# Patient Record
Sex: FEMALE
[De-identification: names, ages, dates, MRNs, and addresses within clinical notes are randomized per-mention and may not be internally consistent; named-entity substitution may affect disease eponyms.]

---

## 2018-05-05 ENCOUNTER — HOSPITAL ENCOUNTER (INPATIENT)
Dept: HOSPITAL 42 - ED | Age: 58
LOS: 4 days | Discharge: HOME | DRG: 557 | End: 2018-05-09
Attending: FAMILY MEDICINE | Admitting: FAMILY MEDICINE
Payer: MEDICAID

## 2018-05-05 VITALS — BODY MASS INDEX: 34.2 KG/M2

## 2018-05-05 DIAGNOSIS — K57.10: ICD-10-CM

## 2018-05-05 DIAGNOSIS — I12.9: ICD-10-CM

## 2018-05-05 DIAGNOSIS — K80.20: ICD-10-CM

## 2018-05-05 DIAGNOSIS — E66.9: ICD-10-CM

## 2018-05-05 DIAGNOSIS — K21.9: ICD-10-CM

## 2018-05-05 DIAGNOSIS — M89.9: ICD-10-CM

## 2018-05-05 DIAGNOSIS — C90.00: ICD-10-CM

## 2018-05-05 DIAGNOSIS — K85.10: Primary | ICD-10-CM

## 2018-05-05 DIAGNOSIS — Z92.21: ICD-10-CM

## 2018-05-05 DIAGNOSIS — K76.0: ICD-10-CM

## 2018-05-05 DIAGNOSIS — N18.4: ICD-10-CM

## 2018-05-05 DIAGNOSIS — E11.22: ICD-10-CM

## 2018-05-05 DIAGNOSIS — Z82.49: ICD-10-CM

## 2018-05-05 DIAGNOSIS — N17.9: ICD-10-CM

## 2018-05-05 DIAGNOSIS — Z87.891: ICD-10-CM

## 2018-05-05 DIAGNOSIS — Z88.5: ICD-10-CM

## 2018-05-05 LAB
ALBUMIN SERPL-MCNC: 4.4 G/DL (ref 3–4.8)
ALBUMIN/GLOB SERPL: 1.3 {RATIO} (ref 1.1–1.8)
ALT SERPL-CCNC: 827 U/L (ref 7–56)
APPEARANCE UR: CLEAR
APTT BLD: 26.5 SECONDS (ref 25.1–36.5)
AST SERPL-CCNC: 426 U/L (ref 14–36)
BACTERIA #/AREA URNS HPF: (no result) /[HPF]
BASOPHILS # BLD AUTO: 0.02 K/MM3 (ref 0–2)
BASOPHILS NFR BLD: 0.4 % (ref 0–3)
BILIRUB UR-MCNC: (no result) MG/DL
BUN SERPL-MCNC: 22 MG/DL (ref 7–21)
CALCIUM SERPL-MCNC: 9.9 MG/DL (ref 8.4–10.5)
COLOR UR: YELLOW
EOSINOPHIL # BLD: 0.2 10*3/UL (ref 0–0.7)
EOSINOPHIL NFR BLD: 2.7 % (ref 1.5–5)
EPI CELLS #/AREA URNS HPF: (no result) /HPF (ref 0–5)
ERYTHROCYTE [DISTWIDTH] IN BLOOD BY AUTOMATED COUNT: 13.3 % (ref 11.5–14.5)
GFR NON-AFRICAN AMERICAN: 23
GLUCOSE UR STRIP-MCNC: 250 MG/DL
GRANULOCYTES # BLD: 3.21 10*3/UL (ref 1.4–6.5)
GRANULOCYTES NFR BLD: 58.8 % (ref 50–68)
HDLC SERPL-MCNC: 96 MG/DL (ref 29–60)
HGB BLD-MCNC: 15 G/DL (ref 12–16)
INR PPP: 0.99 (ref 0.93–1.08)
LDLC SERPL-MCNC: 75 MG/DL (ref 0–129)
LEUKOCYTE ESTERASE UR-ACNC: NEGATIVE LEU/UL
LYMPHOCYTES # BLD: 1.6 10*3/UL (ref 1.2–3.4)
LYMPHOCYTES NFR BLD AUTO: 29.5 % (ref 22–35)
MCH RBC QN AUTO: 29.9 PG (ref 25–35)
MCHC RBC AUTO-ENTMCNC: 33.6 G/DL (ref 31–37)
MCV RBC AUTO: 89 FL (ref 80–105)
MONOCYTES # BLD AUTO: 0.5 10*3/UL (ref 0.1–0.6)
MONOCYTES NFR BLD: 8.6 % (ref 1–6)
PH UR STRIP: 7 [PH] (ref 4.7–8)
PLATELET # BLD: 181 10^3/UL (ref 120–450)
PMV BLD AUTO: 11.9 FL (ref 7–11)
PROT UR STRIP-MCNC: 30 MG/DL
PROTHROMBIN TIME: 11.4 SECONDS (ref 9.4–12.5)
RBC # BLD AUTO: 5.02 10^6/UL (ref 3.5–6.1)
RBC # UR STRIP: (no result) /UL
RBC #/AREA URNS HPF: (no result) /HPF (ref 0–2)
SP GR UR STRIP: 1.01 (ref 1–1.03)
UROBILINOGEN UR STRIP-ACNC: 0.2 E.U./DL
WBC # BLD AUTO: 5.5 10^3/UL (ref 4.5–11)
WBC #/AREA URNS HPF: (no result) /HPF (ref 0–6)

## 2018-05-05 NOTE — CT
PROCEDURE:  CT Abdomen and Pelvis without intravenous contrast



HISTORY:

pancreatitis.  Elevated lipase 



COMPARISON:

None.



TECHNIQUE:

Without contrast.. 



Contrast Dose: 



Radiation dose: 



Total exam DLP = 



Total exam DLP = 862 mGy-cm.



This CT exam was performed using one or more of the following dose 

reduction techniques: Automated exposure control, adjustment of the 

mA and/or kV according to patient size, and/or use of iterative 

reconstruction technique.



FINDINGS:



LOWER THORAX:

Unremarkable. 



LIVER:

There is severe fatty infiltration of the liver.  



GALLBLADDER AND BILE DUCTS:

Multiple gallstones are seen 



PANCREAS:

There is mild swelling of the pancreas. Minimal inflammatory changes 

are seen in the adjacent fat planes.  This is consistent with 

pancreatitis.  There is a clinical history of markedly elevated 

lipase.



SPLEEN:

Unremarkable. 



ADRENALS:

Unremarkable. No mass. 



KIDNEYS AND URETERS:

Unremarkable. No hydronephrosis. No solid mass. 



VASCULATURE:

Unremarkable. No aortic aneurysm. 



BOWEL:

Unremarkable. No obstruction. No gross mural thickening. 



APPENDIX:

Unremarkable. Normal appendix. 



PERITONEUM:

Unremarkable. No free fluid. No free air. 



LYMPH NODES:

Unremarkable. No enlarged lymph nodes. 



BLADDER:

Unremarkable. 



REPRODUCTIVE:

Unremarkable. 



BONES:

No acute fracture. 



OTHER FINDINGS:

None.



IMPRESSION:

Mild pancreatitis.  No evidence of pseudocyst formation.



Severe fatty infiltration of the liver.  Multiple gallstones

## 2018-05-05 NOTE — CP.PCM.CON
History of Present Illness





- History of Present Illness


History of Present Illness: 





Surgery Consult: Dr. Auguste





Pt is a 57F with PMHx significant for HTN, DM (diet controlled) & light chain 

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 

pain x 4 days. Pt states she started having RUQ abdominal pain on wednesday 

with episodes of nausea. She noticed pain was more noticeable after eating. As 

the week progressed her pain got worse and she wasn't able to keep anything 

down. This morning she had severe pain which radiated to the R flank and 

epigastric regions so she came to the ER. 


In the ER, pt was found to have elevated liver enzymes as well as lipase of 89,

000. CT abdomen/pelvis was obtained and showed evidence of gallstones with 

pancreatitis. Surgery called to evaluate. Currently, pt is resting comfortably 

in bed. States her pain is slightly better but still present. She denies any 

episodes of vomiting but admits to nausea. Denies F/C, chest pain or SOB. 





PMHx: as stated above


PSHx: denies


SocialHx: 20+ yr smoking hx, quit years ago. Social EtOH, denies drugs


All: codeine  





Review of Systems





- Review of Systems


All systems: reviewed and no additional remarkable complaints except (as per HPI

)





Past Patient History





- Infectious Disease


Hx of Infectious Diseases: None





- Past Social History


Smoking Status: Former Smoker


Alcohol: Social


Drugs: Denies





- CARDIAC


Hx Hypertension: Yes





- PULMONARY


Hx Respiratory Disorders: No





- NEUROLOGICAL


Hx Neurological Disorder: No





- RENAL


Hx Chronic Kidney Disease: Yes


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





- ENDOCRINE/METABOLIC


Hx Diabetes Mellitus Type 2: Yes (borderline)





- HEMATOLOGICAL/ONCOLOGICAL


Hx Chemotherapy: Yes


Other/Comment: light chain deposition disease dx 2011, rare blood cell disease, 

pt was treated with chemotherapy and completed, pt has checkups every 2 months 

with Dr. Stanton





- INTEGUMENTARY


Hx Dermatological Problems: No





- GASTROINTESTINAL


Hx Gall Bladder Disease: Yes (gallstones)


Hx Gastroesophageal Reflux: Yes





- SURGICAL HISTORY


Hx Surgeries: No





- ANESTHESIA


Hx Anesthesia: No





Meds


Allergies/Adverse Reactions: 


 Allergies











Allergy/AdvReac Type Severity Reaction Status Date / Time


 


codeine Allergy  SHORTNESS Verified 05/05/18 09:25





   OF BREATH  














- Medications


Medications: 


 Current Medications





Sodium Chloride (Sodium Chloride 0.9%)  1,000 mls @ 100 mls/hr IV .Q10H STA


   Stop: 05/06/18 00:02


   Last Admin: 05/05/18 14:47 Dose:  100 mls/hr


Lactated Ringer's (Lactated Ringer's)  1,000 mls @ 175 mls/hr IV .Q5H43M [facility]











Physical Exam





- Constitutional


Appears: Well, No Acute Distress





- Head Exam


Head Exam: ATRAUMATIC, NORMOCEPHALIC





- Eye Exam


Eye Exam: Normal appearance





- ENT Exam


ENT Exam: Mucous Membranes Moist





- Respiratory Exam


Respiratory Exam: NORMAL BREATHING PATTERN





- Cardiovascular Exam


Cardiovascular Exam: RRR





- GI/Abdominal Exam


GI & Abdominal Exam: Soft, Tenderness (epigsatric).  absent: Distended, Guarding





- Neurological Exam


Neurological exam: Alert, Oriented x3





- Skin


Skin Exam: Dry, Warm





Results





- Vital Signs


Recent Vital Signs: 


 Last Vital Signs











Temp  99.4 F   05/05/18 14:47


 


Pulse  67   05/05/18 15:18


 


Resp  19   05/05/18 15:18


 


BP  114/75   05/05/18 14:47


 


Pulse Ox  98   05/05/18 15:18














- Labs


Result Diagrams: 


 05/05/18 09:40





 05/05/18 09:40





- Imaging and Cardiology


  ** CT scan - abdomen


Status: Image reviewed by me, Report reviewed by me





Assessment & Plan





- Assessment and Plan (Free Text)


Assessment: 





57F with gallstone pancreatitis


Plan: 


- NPO with aggressive IVF resuscitation


- serial abdominal exams


- GI recs 


- AM labs to monitor LFTs


- plan for cholecystectomy once pancreatitis resolves


- d/w Dr. Molina Uribe, PGY-3

## 2018-05-05 NOTE — US
HISTORY:

ROUT MED EXAM



COMPARISON:

None.



TECHNIQUE:

Sonographic evaluation of the abdomen.



FINDINGS:



LIVER:

Measures 13.5 by 12 cm.  Increased echogenicity of the liver 

parenchyma. No mass. No intrahepatic bile duct dilatation.



GALLBLADDER:

Gallstones and sludge



COMMON BILE DUCT:

Measures 8 mm. No stones. No dilatation.



PANCREAS:

Unremarkable as visualized. No mass. No ductal dilatation.



RIGHT KIDNEY:

Measures 9.82 x 4.10 x 5.04cm. Normal echogenicity. No calculus, mass,

 or hydronephrosis.



LEFT KIDNEY:

Measures 9.89 x 5.79 x 5.27cm. Normal echogenicity. No calculus, mass,

 or hydronephrosis.



SPLEEN:

Normal in size and contour. No mass. 9.25 x 3.06 



AORTA:

No aneurysmal dilatation. 



IVC:

Unremarkable. 



OTHER FINDINGS:

None. 



IMPRESSION:

Severe fatty infiltration of the liver.



Gallstones and sludge in the gallbladder. No evidence of acute 

cholecystitis

## 2018-05-05 NOTE — ED PDOC
Arrival/HPI





- General


Chief Complaint: Abdominal Pain


Time Seen by Provider: 05/05/18 09:37


Historian: Patient





- History of Present Illness


Narrative History of Present Illness (Text): 





05/05/18 10:39


A 57 year old female mildly obese, whose past medical history includes 

gallstones, light chain kidney disease, hypertension, and borderline pre-

diabetic, presents to the emergency department for right upper quadrant pain 

that wraps in a band around her flank, she has associated mild nausea with 

pain. The patient reports that the pain has worsened over the last 3 days and 

is at its worse today. She notes that she urine is darker and her stool appears 

to be "chalky," but she denies any fever, chills, vomiting, or any other 

complaints at this time. 


Time/Duration: < week (3 days)


Symptom Onset: Gradual


Symptom Course: Worsening


Severity Level: Mild


Activities at Onset: Light


Context: Home





Past Medical History





- Provider Review


Nursing Documentation Reviewed: Yes





- Infectious Disease


Hx of Infectious Diseases: None





- Reproductive


Menopause: Yes





- Cardiac


Hx Hypertension: Yes





- Endocrine/Metabolic


Hx Diabetes Mellitus Type 2:  (borderline)





- Psychiatric


Hx Substance Use: No





Family/Social History





- Physician Review


Nursing Documentation Reviewed: Yes


Family/Social History: No Known Family HX


Smoking Status: Never Smoked


Hx Alcohol Use: Yes


Frequency of alcohol use: Socially


Hx Substance Use: No





Allergies/Home Meds


Allergies/Adverse Reactions: 


Allergies





codeine Allergy (Verified 05/05/18 09:25)


 SHORTNESS OF BREATH








Home Medications: 


 Home Meds











 Medication  Instructions  Recorded  Confirmed


 


Aspirin [Aspirin Chewable] 81 mg PO DAILY 05/05/18 05/05/18


 


Valsartan [Diovan] 80 mg PO DAILY 05/05/18 05/05/18














Review of Systems





- Physician Review


All systems were reviewed & negative as marked: Yes





- Review of Systems


Constitutional: absent: Fevers


Gastrointestinal: Abdominal Pain (RUQ), Stool Changes ("chalky")


Genitourinary Female: Other ("darker urine" )





Physical Exam


Vital Signs Reviewed: Yes


Vital Signs











  Temp Pulse Resp BP Pulse Ox


 


 05/05/18 15:18   67  19   98


 


 05/05/18 14:47  99.4 F  96 H  18  114/75  96


 


 05/05/18 09:20  98.1 F  136 H  22  136/81  96











Temperature: Afebrile


Blood Pressure: Normal


Pulse: Tachycardic


Respiratory Rate: Normal


Appearance: Positive for: Well-Appearing, Non-Toxic, Comfortable


Pain Distress: None


Mental Status: Positive for: Alert and Oriented X 3





- Systems Exam


Head: Present: Atraumatic, Normocephalic


Pupils: Present: PERRL


Extroacular Muscles: Present: EOMI


Conjunctiva: Present: Normal


Mouth: Present: Moist Mucous Membranes


Neck: Present: Normal Range of Motion


Respiratory/Chest: Present: Clear to Auscultation, Good Air Exchange.  No: 

Respiratory Distress, Accessory Muscle Use


Cardiovascular: Present: Regular Rate and Rhythm, Normal S1, S2.  No: Murmurs


Abdomen: Present: Tenderness (RUQ).  No: Distention, Peritoneal Signs, Guarding


Back: Present: Normal Inspection


Upper Extremity: Present: Normal Inspection.  No: Cyanosis, Edema


Lower Extremity: Present: Normal Inspection.  No: Edema


Neurological: Present: GCS=15, CN II-XII Intact, Speech Normal


Skin: Present: Warm, Dry, Normal Color.  No: Rashes


Psychiatric: Present: Alert, Oriented x 3, Normal Insight, Normal Concentration





Medical Decision Making


ED Course and Treatment: 


05/05/18 10:47


Impression: A 57 year old female with RUQ 





Differential Diagnosis included but are not limited to:  





Plan:


-- EKG


-- Labs


-- Pepcid, Morphine, Zofran, IV Fluids


-- Abdomen Complete US 


-- Reassess and disposition





Progress Notes:





EKG:


Ordered, reviewed, and independently interpreted the EKG.


Rate :  1050BPM


Rhythm : Sinus Tachycardia 


Interpretation : No ST-segment elevations or depressions, no T-wave inversions, 

normal intervals.


Comparison : No previous EKG for comparison.  





- Lab Interpretations


Lab Results: 








 05/05/18 09:40 





 05/05/18 09:40 





 Lab Results





05/05/18 09:40: Urine Color Yellow, Urine Appearance Clear, Urine pH 7.0, Ur 

Specific Gravity 1.015, Urine Protein 30 H, Urine Glucose (UA) 250 H, Urine 

Ketones Negative, Urine Blood Trace-intact H, Urine Nitrate Negative, Urine 

Bilirubin Small H, Urine Urobilinogen 0.2, Ur Leukocyte Esterase Negative, 

Urine RBC 0 - 2, Urine WBC 0 - 2, Ur Epithelial Cells 0 - 2, Urine Bacteria Few


05/05/18 09:40: Sodium 145, Potassium 3.8, Chloride 109 H, Carbon Dioxide 24, 

Anion Gap 16, BUN 22 H, Creatinine 2.2 H, Est GFR ( Amer) 28, Est GFR (

Non-Af Amer) 23, Random Glucose 224 H, Calcium 9.9, Magnesium 2.2, Total 

Bilirubin 4.4 H,  H,  H, Alkaline Phosphatase 220 H, Total 

Protein 7.9, Albumin 4.4, Globulin 3.5, Albumin/Globulin Ratio 1.3, Lipase 

13401 H


05/05/18 09:40: PT 11.4, INR 0.99, APTT 26.5


05/05/18 09:40: WBC 5.5, RBC 5.02, Hgb 15.0, Hct 44.7, MCV 89.0, MCH 29.9, MCHC 

33.6, RDW 13.3, Plt Count 181, MPV 11.9 H, Gran % 58.8, Lymph % (Auto) 29.5, 

Mono % (Auto) 8.6 H, Eos % (Auto) 2.7, Baso % (Auto) 0.4, Gran # 3.21, Lymph # (

Auto) 1.6, Mono # (Auto) 0.5, Eos # (Auto) 0.2, Baso # (Auto) 0.02


05/05/18 09:00: Triglycerides 97, Cholesterol 221 H, LDL Cholesterol Direct 75, 

HDL Cholesterol 96 H











- RAD Interpretation


Radiology Orders: 








05/05/18 09:37


ABDOMEN COMPLETE [US] Stat 





05/05/18 12:33


ABD & PELVIS W/O PO OR IV CONT [CT] Stat 














- Medication Orders


Current Medication Orders: 








Hydromorphone HCl (Dilaudid)  0.5 mg IVP Q6H PRN


   PRN Reason: Pain, severe (8-10)


Sodium Chloride (Sodium Chloride 0.9%)  1,000 mls @ 100 mls/hr IV .Q10H STA


   Stop: 05/06/18 00:02


   Last Admin: 05/05/18 14:47  Dose: 100 mls/hr





eMAR Start Stop


 Document     05/05/18 14:47  CASTS1  (Rec: 05/05/18 14:47  CASTS1  [id number])


     Intravenous Solution


      Start Date                                 05/05/18


      Start Time                                 14:47


      End Date                                   05/05/18





Lactated Ringer's (Lactated Ringer's)  1,000 mls @ 175 mls/hr IV .Q5H43M [facility]INIC


   Last Admin: 05/05/18 17:27  Dose: 175 mls/hr





eMAR Start Stop


 Document     05/05/18 17:27  MARISOL  (Rec: 05/05/18 17:27  MARISOL  [id number])


     Intravenous Solution


      Start Date                                 05/05/18


      Start Time                                 17:27





Ondansetron HCl (Zofran Inj)  4 mg IVP Q4H PRN


   PRN Reason: Nausea/Vomiting





Discontinued Medications





Famotidine (Pepcid 20mg/50ml Premix)  20 mg IVPB STAT STA


   Stop: 05/05/18 09:54


   Last Admin: 05/05/18 10:07  Dose: 20 mg





eMAR Start Stop


 Document     05/05/18 10:07  CASTS1  (Rec: 05/05/18 10:07  CASTS1  [id number])


     Intravenous Solution


      Start Date                                 05/05/18


      Start Time                                 10:07





Sodium Chloride (Sodium Chloride 0.9%)  1,000 mls @ 1,000 mls/hr IV .Q1H STA


   Stop: 05/05/18 10:36


   Last Admin: 05/05/18 10:07  Dose: 1,000 mls/hr





eMAR Start Stop


 Document     05/05/18 10:07  CASTS1  (Rec: 05/05/18 10:07  CASTS1  [id number])


     Intravenous Solution


      Start Date                                 05/05/18


      Start Time                                 10:07


      End Date                                   05/05/18





Morphine Sulfate (Morphine)  2 mg IVP STAT STA


   Stop: 05/05/18 09:53


   Last Admin: 05/05/18 10:08  Dose: 2 mg





MAR Pain Assessment


 Document     05/05/18 10:08  CASTS1  (Rec: 05/05/18 10:08  CASTS1  [id number])


     Pain Reassessment


      Is this a pain reassessment?               No


     Sleep


      Is patient sleeping during reassessment?   No


     Presence of Pain


      Presence of Pain                           Yes


     Pain Scale Used


      Pain Scale Used                            Numeric


     Location


      Left, Right or Bilateral                   Right


      Pain Location Body Site                    Abdomen


     Description


      Description                                Constant


      Intensity of Pain at present               10


      Pain Behavior                              Facial Grimacing


      Aggravating Factors                        Changing Position


      Alleviating Factors/Management             Medication


       Techniques                                


      Alleviating Factors                        Medication


IVP Administration


 Document     05/05/18 10:08  CASTS1  (Rec: 05/05/18 10:08  [facility]  [id number][facility]


     Charges for Administration


      # of IVP Administrations                   1





Ondansetron HCl (Zofran Inj)  4 mg IVP STAT STA


   Stop: 05/05/18 09:54


   Last Admin: 05/05/18 10:07  Dose: 4 mg





IVP Administration


 Document     05/05/18 10:07  CASTS1  (Rec: 05/05/18 10:07  [facility]  [id number])


     Charges for Administration


      # of IVP Administrations                   1





Pneumococcal Polyvalent Vaccine (Pneumovax 23 Vaccine)  0.5 ml IM .ONCE ONE


   Stop: 05/05/18 16:35











- Scribe Statement


The provider has reviewed the documentation as recorded by the Violeta Palacios





Provider Scribe Attestation:


All medical record entries made by the Scribe were at my direction and 

personally dictated by me. I have reviewed the chart and agree that the record 

accurately reflects my personal performance of the history, physical exam, 

medical decision making, and the department course for this patient. I have 

also personally directed, reviewed, and agree with the discharge instructions 

and disposition.








Disposition/Present on Arrival





- Present on Arrival


Any Indicators Present on Arrival: No


History of DVT/PE: No


History of Uncontrolled Diabetes: No


Urinary Catheter: No


History of Decub. Ulcer: No


History Surgical Site Infection Following: None





- Disposition


Have Diagnosis and Disposition been Completed?: Yes


Diagnosis: 


 Pancreatitis, Cholelithiasis





Disposition: HOSPITALIZED


Disposition Time: 15:20


Patient Plan: Admission, ICU


Condition: FAIR

## 2018-05-06 LAB
ALBUMIN SERPL-MCNC: 3.4 G/DL (ref 3–4.8)
ALBUMIN/GLOB SERPL: 1.2 {RATIO} (ref 1.1–1.8)
ALT SERPL-CCNC: 579 U/L (ref 7–56)
AST SERPL-CCNC: 183 U/L (ref 14–36)
BUN SERPL-MCNC: 20 MG/DL (ref 7–21)
CALCIUM SERPL-MCNC: 9.1 MG/DL (ref 8.4–10.5)
ERYTHROCYTE [DISTWIDTH] IN BLOOD BY AUTOMATED COUNT: 13.7 % (ref 11.5–14.5)
GFR NON-AFRICAN AMERICAN: 27
HGB BLD-MCNC: 12.5 G/DL (ref 12–16)
LIPASE SERPL-CCNC: 7497 U/L (ref 23–300)
MCH RBC QN AUTO: 29.7 PG (ref 25–35)
MCHC RBC AUTO-ENTMCNC: 32.9 G/DL (ref 31–37)
MCV RBC AUTO: 90.3 FL (ref 80–105)
PLATELET # BLD: 149 10^3/UL (ref 120–450)
PMV BLD AUTO: 12 FL (ref 7–11)
RBC # BLD AUTO: 4.21 10^6/UL (ref 3.5–6.1)
WBC # BLD AUTO: 5.2 10^3/UL (ref 4.5–11)

## 2018-05-06 NOTE — PN
DATE:  05/06/2018



LOCATION:  The patient is in room 270, bed 2.



PROBLEM:  This is a 57-year-old female, who was admitted to the emergency

room with signs and symptoms of worsening right upper quadrant pain

consistent with evolving cholelithiasis/cholecystitis.  In the ER, was

found to have a significant evidence of gallbladder stone-induced

pancreatitis.  The patient was admitted, given rapidly IV fluids, kept

n.p.o. and has been gradually improving overnight with decrease in the

numbers as far as the liver enzymes are concerned.  Decrease in the

alkaline phosphatase, decrease in lipase as well.



SUBJECTIVE:  The patient is feeling better.  Pain is resolved.  She said

she is hungry and wants to eat.  No other significant complaints.  Denies

any history of nausea or vomiting, fevers or chills.



OBJECTIVE:  The patient is examined in bed.  Vital signs stable.  T-max is

98.4, pulse is 68, respirations 20, blood pressure 127/59, pulse ox 96% on

room air.



PHYSICAL EXAMINATION:

GENERAL:  The patient is awake, alert and oriented, in no acute distress.

HEENT:  Head is normocephalic, atraumatic.  Conjunctivae pale.  Sclerae are

anicteric.  Pupils are equally reactive to light and accommodation. 

Examination of the oropharynx, there are no oropharyngeal lesions.  Tongue

is moist.

NECK:  Supple.  There is no adenopathy.

LUNGS:  Clear to percussion and auscultation.

ABDOMEN:  Examination of the abdomen reveals it to be soft, nondistended. 

There is no rebound, rigidity or guarded noted.

NEUROLOGICAL:  Reveals higher functions to be normal.  No focal deficits

are noted.

SKIN:  Skin turgor is normal.  No skin lesions are noted.

/RECTAL:  Deferred.



LABORATORY DATA:  Lab data was reviewed.  White count is 5.2, hemoglobin

12.5, hematocrit 38, platelet count 149,000.  Sodium is 147, K is 3.8,

chloride is 114, CO2 is 20, creatinine is 1.9, blood sugar 107.  PT/INR is

within normal limits.



ASSESSMENT, NOTES AND PLAN:  The patient is admitted with gallstone

pancreatitis and the background history of having hypertension, renal

azotemia, chronic kidney disease 3 and light chain deposition disease for

which she is being monitored by us, status post chemotherapy 5 years ago. 

Plan is to monitor serial liver enzymes, which are trending down.  Continue

to monitor liver function tests and her chemistries.  The patient is going

to be kept n.p.o.  Continue IV fluids.  Serial abdominal exams.  Awaiting

Gastrointestinal for possible endoscopy.  Plan would be for cholecystectomy

once the pancreatitis resolved, which maybe delayed for several days and

may be up to several weeks once endoscopy is completed.  Routine post exam

instructions have been given to the patient.  Labs have been monitored and

followed over the next several days.



Time spent on the patient 45 minutes.





__________________________________________

Kathya Stanton MD





DD:  05/06/2018 15:01:58

DT:  05/06/2018 15:18:15

Job # [id number]

## 2018-05-06 NOTE — CON
DATE:



NEPHROLOGY CONSULTATION



LOCATION:  [facility] (formerly [facility]3].



HISTORY OF PRESENT ILLNESS:  A 57-year-old female with past medical history

of hypertension, light chain deposition disease (per renal biopsy, status

post chemo with Velcade, last received in 08/2013) and CKD stage 3B/4. 

Presented to ED today with severe epigastric and right lower quadrant

abdominal pain.  Nephrology consulted for advanced CKD management.



The patient reports first feeling the sort of pain sometime last year, but

symptoms had resolved.  Again, felt this similar pain 3 days ago, but again

the symptoms resolved.  The patient, however, since yesterday has been

having severe right upper quadrant and epigastric abdominal pain also with

associated back pain.  Reports associated nausea, but no vomiting.  Has not

eaten since past 1 day due to symptoms.  The patient otherwise denies any

diarrhea.  Has been having some dysuria lately.



The patient otherwise denies any lightheadedness.  Denies any shortness of

breath.  No chest pain or palpitations.



PAST MEDICAL HISTORY:  As above.  Has been following in our renal clinic

with stable renal function.



SOCIAL HISTORY:  Denies smoking.  Occasional alcohol use, but not lately.



FAMILY HISTORY:  Mother with heart disease.



REVIEW OF SYSTEMS:

CONSTITUTIONAL:  As per HPI.  Has been doing well up until current

symptoms.

HEENT:  Denies any URI symptoms.

RESPIRATORY:  Denies any difficulty breathing.  No cough.

CARDIOVASCULAR:  No chest pain or palpitations.

GI:  As per HPI.

:  As per HPI.

MUSCULOSKELETAL:  Denies taking any pain medications, but does get

occasional joint pains.

NEURO:  No dizziness.



PHYSICAL EXAMINATION:

VITAL SIGNS:  Vitals this afternoon, blood pressure 114/75, heart rate 96,

respirations 18, temperature 99.4, O2 sat 98% on room air.

GENERAL:  No distress.  Conversing coherently in full sentences.

HEENT:  Moist mucous membranes.  Mild scleral icterus seen.

RESPIRATORY:  Lungs clear to auscultation bilaterally.  No rales.  No

rhonchi.  No wheezes.

CARDIOVASCULAR:  Heart sounds S1 and S2 normal.  No murmurs.  No gallops. 

No rubs.  Regular rate and rhythm.

GI:  Abdomen soft, nondistended.  Tenderness in epigastric and right upper

quadrant on light palpation.

:  No bladder distention.

EXTREMITIES:  No significant leg edema.

SKIN:  Warm.  No cyanosis.

NEURO:  No numbness in feet.  No tremor of outstretched hands.

PSYCHIATRIC:  Normal mood, normal affect.



LABORATORY DATA:  CBC:  WBC 5.5, hemoglobin 15, hematocrit 44.7, platelets

181.  Chemistry panel:  Sodium 145, potassium 3.8, chloride 109, bicarb 24,

BUN 22, creatinine 2.2, glucose 224, calcium 9.9, magnesium 2.2 with T bili

4.4, , , alk phos 220, albumin 4.4, lipase 89,479,

triglycerides 97, total cholesterol 221.



Abdominal ultrasound directly reviewed.  No hydronephrosis seen.



ASSESSMENT AND PLAN:

1.  Acute kidney injury on chronic kidney disease stage 3B/4.  Relatively,

mild increase in serum creatinine from baseline of 1.9 up to 2.2 today. 

Likely prerenal etiology in the setting of decreased p.o. intake and also

with evidence of acute pancreatitis likely causing some third spacing.

A.  Agree with IV fluids placed by surgical services with lactated Ringer's

at 175 mL/hour.

B.  Continue to avoid nephrotoxic agents.

C.  Need to keep the patient in positive fluid balance.

D.  Monitor inputs and outputs closely.

2.  Chronic kidney disease stage 3B/4.  Baseline creatinine 1.9,

corresponding to EGFR of 28 mL/minute.  The patient with mild proteinuric

kidney disease in the setting of having light chain deposition disease on

renal biopsy, treated with Velcade.

3.  _____, currently on valsartan 320 mg.

4.  Hypertension.  The patient currently normotensive.  Agree with holding

antihypertensive medications for now.

5.  Light chain deposition disease, was being monitored by Hematology every

6 weeks.  Last set of labs did not show anything on serum electrophoresis

or immunofixation, although does show proteins present in urine.  We will

continue to monitor for any increase in proteinuria.

6.  Chronic kidney disease mineral bone disorder.  PTH had been elevated. 

We will repeat level as well as 25-hydroxy vitamin D level.



Thank you for this referral.  We will be following up closely.





__________________________________________

Manuel Rose MD





DD:  05/05/2018 17:45:18

DT:  05/05/2018 17:54:38

Job # [id number]

## 2018-05-06 NOTE — HP
LOCATION:  The patient is in room 270, bed 2.



HISTORY OF PRESENT ILLNESS:  This is a 57-year-old female who is well known

to me.  Patient is being followed by us for history of light-chain

deposition disease for over 8 years for which she had treatment 7 years ago

and has been off treatment now for several years.  Patient was treated at

that time with Velcade based chemotherapy and she had progressively

worsening renal azotemia and because of the light-chain deposition and

based on obtaining biopsies and discussions with the Nephrologist, patient

was recommended systemic therapy and patient received 12 cycles of therapy

and her proteinuria abated and her light chain excretion decreased and has

not been seen now for several years and is being monitored very closely. 

Patient's history is also significant for hypertension,diabetes mellitus

that is diet controlled, who now presets to [facility] with

progressively worsening abdominal pain for the last 4 days.  Patient states

she started having right upper quadrant pain on Wednesday with episodes of

nausea.  She noticed pain, which was more noticeable after eating.  As a

week progress, pain got worse and she was unable to keep anything down. 

Patient had severe pain this morning, which radiated to the right flank and

epigastric region, so she came to the emergency room.  In the emergency

room, patient was found to have elevated liver enzymes as well as lipase of

89, 000.  CAT scan of the abdomen and pelvis was obtained and showed

evidence of gallstones with pancreatitis.  In view of this, Surgery was

already called to evaluate the patient as well as GI consultation was

obtained.  Currently, the patient is resting comfortably in the bed. 

States the pain is currently improved, better, but still present.  No

episodes of vomiting, but admits to nausea.  No chest pain, shortness of

breath, fever or chills.



PAST MEDICAL HISTORY:  Significant for what is mentioned above including

light-chain deposition disease, hypertension and diabetes.



SOCIAL HISTORY:  Patient has a history of smoking for 20 years, quit

several years ago.  Denies any EtOH abuse.  Denies any drug usage as well.



REVIEW OF SYSTEMS:  A 12-system review was done and there were no

additional complaints except what is mentioned in the HPI.



ALLERGIES:  PATIENT HAS NO KNOWN ALLERGIES.



MEDICATIONS:  Patient is currently on IV fluids with Ringer lactate at 175

mL an hour.



PHYSICAL EXAMINATION:

GENERAL:  The patient is awake, alert and oriented, in no significant

distress.

VITAL SIGNS:  Stable.  T-max of 99.4, pulse is 67, respirations 19, blood

pressure is 114/75, pulse ox is 98% on room air.

HEENT:  Head is normocephalic, atraumatic.  Conjunctivae pale.  Pupils are

equally reactive to light and accommodation.  Sclerae are anicteric. 

Examination of the oropharynx reveals moist mucous membranes.

LUNGS:  Reveal relatively clear to percussion and auscultation.

CARDIOVASCULAR SYSTEM:  Reveals PMI to be in the fifth intercostal space

inside the midclavicular line.  S1 and S2 are normal.  No gallop or murmur

is heard.

ABDOMEN:  Soft.  Patient has epigastric tenderness without significant

rebound, rigidity or guarding.  Bowel sounds are present.  No masses are

felt.

NEUROLOGIC:  Reveals higher functions to be normal.  No focal deficits are

noted.

SKIN:  Skin turgor is normal.  No skin lesions are noted.



LABORATORY DATA:  Reveals a white count of 5.5, hemoglobin of 15,

hematocrit 44.7, platelet count of 181,000.  Sodium is 145, K is 3.8,

chloride is 109, CO is 24, BUN is 22, creatinine is 2.2 and blood sugar is

224.



ASSESSMENT, NOTES AND PLAN:  Patient has cholelithiasis with gallstone

pancreatitis, has evidence on the CAT scan as there is evidence of

pancreatitis on the CT.  Plan is to keep the patient n.p.o. with aggressive

IV resuscitation.  Serial abdominal x-rays and exams.  GI evaluation is

pending.  Dr. Mack is going to see the patient.  Surgical evaluation by

Dr. Auguste and his resident is in progress.  We will continue to

monitor labs and monitor the liver function tests.  We will also monitor

for any worsening pancreatic symptoms as well.  Case discussed in detail

with emergency room attending.  We will discuss with the consultants as

well.  Condition of the patient at this point is stable, but overall

prognosis is guarded.





__________________________________________

Kathay Stanton MD



DD:  05/05/2018 20:05:46

DT:  05/06/2018 4:13:06

Job # [id number]

## 2018-05-06 NOTE — CP.PCM.PN
Subjective





- Date & Time of Evaluation


Date of Evaluation: 05/06/18


Time of Evaluation: 08:50





- Subjective


Subjective: 





Surgery: Dr. Auguste





Pt seen and examined. No acute overnight events. Pt states she is feeling a lot 

better and pain has resolved. She states she's hungry and wants to eat but 

otherwise doesn't have complaints. Denies N/V, F/C. 





Objective





- Vital Signs/Intake and Output


Vital Signs (last 24 hours): 


 











Temp Pulse Resp BP Pulse Ox


 


 98.8 F   68   20   127/59 L  96 


 


 05/06/18 06:00  05/06/18 06:00  05/06/18 06:00  05/06/18 06:00  05/06/18 06:00








Intake and Output: 


 











 05/06/18 05/06/18





 06:59 18:59


 


Intake Total 3140 


 


Balance 3140 














- Medications


Medications: 


 Current Medications





Hydromorphone HCl (Dilaudid)  0.5 mg IVP Q6H PRN


   PRN Reason: Pain, severe (8-10)


Lactated Ringer's (Lactated Ringer's)  1,000 mls @ 175 mls/hr IV .Q5H43M [facility]


   Last Admin: 05/06/18 04:53 Dose:  175 mls/hr


Ondansetron HCl (Zofran Inj)  4 mg IVP Q4H PRN


   PRN Reason: Nausea/Vomiting











- Labs


Labs: 


 





 05/06/18 07:00 





 05/06/18 07:00 





 











PT  11.4 SECONDS (9.4-12.5)   05/05/18  09:40    


 


INR  0.99  (0.93-1.08)   05/05/18  09:40    


 


APTT  26.5 Seconds (25.1-36.5)   05/05/18  09:40    














- Constitutional


Appears: Well, No Acute Distress





- Head Exam


Head Exam: ATRAUMATIC, NORMOCEPHALIC





- Eye Exam


Eye Exam: Normal appearance





- ENT Exam


ENT Exam: Mucous Membranes Moist





- Respiratory Exam


Respiratory Exam: NORMAL BREATHING PATTERN





- Cardiovascular Exam


Cardiovascular Exam: RRR





- GI/Abdominal Exam


GI & Abdominal Exam: Soft.  absent: Distended, Guarding, Tenderness





- Neurological Exam


Neurological Exam: Alert, Awake, Oriented x3





- Skin


Skin Exam: Dry, Warm





Assessment and Plan





- Assessment and Plan (Free Text)


Assessment: 





57F with gallstone pancreatitis


Plan: 





- Tbili & liver enzymes trending down; cont to monitor


- cont NPO with IVF 


- serial abdominal exams 


- plan for cholecystectomy once pancreatitis resolves





Rony, PGY-3

## 2018-05-07 LAB
ALBUMIN SERPL-MCNC: 3.5 G/DL (ref 3–4.8)
ALBUMIN/GLOB SERPL: 1.2 {RATIO} (ref 1.1–1.8)
ALT SERPL-CCNC: 385 U/L (ref 7–56)
AMYLASE SERPL-CCNC: 314 U/L (ref 35–125)
AST SERPL-CCNC: 78 U/L (ref 14–36)
BASOPHILS # BLD AUTO: 0.03 K/MM3 (ref 0–2)
BASOPHILS NFR BLD: 0.6 % (ref 0–3)
BUN SERPL-MCNC: 21 MG/DL (ref 7–21)
CALCIUM SERPL-MCNC: 9 MG/DL (ref 8.4–10.5)
EOSINOPHIL # BLD: 0.3 10*3/UL (ref 0–0.7)
EOSINOPHIL NFR BLD: 4.6 % (ref 1.5–5)
ERYTHROCYTE [DISTWIDTH] IN BLOOD BY AUTOMATED COUNT: 13.6 % (ref 11.5–14.5)
GFR NON-AFRICAN AMERICAN: 29
GRANULOCYTES # BLD: 3 10*3/UL (ref 1.4–6.5)
GRANULOCYTES NFR BLD: 55.2 % (ref 50–68)
HGB BLD-MCNC: 11.7 G/DL (ref 12–16)
LIPASE SERPL-CCNC: 777 U/L (ref 23–300)
LYMPHOCYTES # BLD: 1.7 10*3/UL (ref 1.2–3.4)
LYMPHOCYTES NFR BLD AUTO: 31.5 % (ref 22–35)
MCH RBC QN AUTO: 29.2 PG (ref 25–35)
MCHC RBC AUTO-ENTMCNC: 32.5 G/DL (ref 31–37)
MCV RBC AUTO: 89.8 FL (ref 80–105)
MONOCYTES # BLD AUTO: 0.4 10*3/UL (ref 0.1–0.6)
MONOCYTES NFR BLD: 8.1 % (ref 1–6)
PLATELET # BLD: 142 10^3/UL (ref 120–450)
PMV BLD AUTO: 11.6 FL (ref 7–11)
RBC # BLD AUTO: 4.01 10^6/UL (ref 3.5–6.1)
WBC # BLD AUTO: 5.4 10^3/UL (ref 4.5–11)

## 2018-05-07 NOTE — CP.PCM.PN
Subjective





- Date & Time of Evaluation


Date of Evaluation: 05/07/18


Time of Evaluation: 11:00





- Subjective


Subjective: 





Patient reports feeling well; tolerating liquid diet; no sob; abd/back pain 

resolved;





Objective





- Vital Signs/Intake and Output


Vital Signs (last 24 hours): 


 











Temp Pulse Resp BP Pulse Ox


 


 99.1 F   73   18   135/66   98 


 


 05/07/18 17:58  05/07/18 18:00  05/07/18 17:58  05/07/18 17:58  05/07/18 06:00








Intake and Output: 


 











 05/07/18 05/08/18





 18:59 06:59


 


Intake Total 660 


 


Output Total 1500 


 


Balance -840 














- Medications


Medications: 


 Current Medications





Heparin Sodium (Porcine) (Heparin)  5,000 units SC Q12 [facility]


   PRN Reason: Protocol


   Last Admin: 05/07/18 21:57 Dose:  Not Given


Hydromorphone HCl (Dilaudid)  0.5 mg IVP Q6H PRN


   PRN Reason: Pain, severe (8-10)


Lactated Ringer's (Lactated Ringer's)  1,000 mls @ 80 mls/hr IV .[id number] [facility]


   Last Admin: 05/07/18 17:45 Dose:  80 mls/hr


Ondansetron HCl (Zofran Inj)  4 mg IVP Q4H PRN


   PRN Reason: Nausea/Vomiting











- Labs


Labs: 


 





 05/07/18 05:30 





 05/07/18 05:30 





 











PT  11.4 SECONDS (9.4-12.5)   05/05/18  09:40    


 


INR  0.99  (0.93-1.08)   05/05/18  09:40    


 


APTT  26.5 Seconds (25.1-36.5)   05/05/18  09:40    














- Constitutional


Appears: Non-toxic, No Acute Distress





- Eye Exam


Eye Exam: Normal appearance





- ENT Exam


ENT Exam: Mucous Membranes Moist





- Respiratory Exam


Respiratory Exam: Clear to Ausculation Bilateral.  absent: Respiratory Distress





- Cardiovascular Exam


Cardiovascular Exam: RRR, +S1, +S2





- Extremities Exam


Additional comments: 





no leg edema;





- Neurological Exam


Neurological Exam: Alert, Awake





- Psychiatric Exam


Psychiatric exam: Normal Mood.  absent: Agitated





- Skin


Skin Exam: Warm.  absent: Cyanosis





Assessment and Plan


(1) Acute pancreatitis


Assessment & Plan: 


Symptoms resolved; gallstone pancreatitis, likely passed stone with LFT's/

transaminases/symptoms improved; tolerating diet;


-decreasing IVF to LR at 80 cc/hr;


Status: Acute   





(2) CKD (chronic kidney disease), stage IV


Assessment & Plan: 


Mild pre-renal SOLOMON, resolved; renal function at baseline; continue to avoid 

nephrotoxic agents;


Status: Acute   





(3) HTN (hypertension)


Assessment & Plan: 


Normotensive currently, continue to hold ARB until back to regular diet;


Status: Chronic

## 2018-05-07 NOTE — CP.PCM.PN
Subjective





- Date & Time of Evaluation


Date of Evaluation: 05/07/18


Time of Evaluation: 07:30





- Subjective


Subjective: 





Surgery: Dr. Auguste





Patient seen and examined at bedside. No acute overnight events. Patient 

reports feeling better overall, denies any abdominal pain. Patient was started 

on a liquid diet yesterday, and is tolerating small amounts of liquid. Denies 

nausea, vomiting, diarrhea, constipation, fever, chills. Patient scheduled for 

MRCP today per GI





Objective





- Vital Signs/Intake and Output


Vital Signs (last 24 hours): 


 











Temp Pulse Resp BP Pulse Ox


 


 98.3 F   66   18   122/61   98 


 


 05/07/18 06:00  05/07/18 06:00  05/07/18 06:00  05/07/18 06:00  05/07/18 06:00








Intake and Output: 


 











 05/07/18 05/07/18





 06:59 18:59


 


Intake Total 980 


 


Output Total 1300 


 


Balance -320 














- Medications


Medications: 


 Current Medications





Heparin Sodium (Porcine) (Heparin)  5,000 units SC Q12 [facility]


   PRN Reason: Protocol


   Last Admin: 05/06/18 22:12 Dose:  Not Given


Hydromorphone HCl (Dilaudid)  0.5 mg IVP Q6H PRN


   PRN Reason: Pain, severe (8-10)


Lactated Ringer's (Lactated Ringer's)  1,000 mls @ 175 mls/hr IV .Q5H43M [facility]


   Last Admin: 05/06/18 22:13 Dose:  175 mls/hr


Ondansetron HCl (Zofran Inj)  4 mg IVP Q4H PRN


   PRN Reason: Nausea/Vomiting











- Labs


Labs: 


 





 05/07/18 05:30 





 05/07/18 05:30 





 











PT  11.4 SECONDS (9.4-12.5)   05/05/18  09:40    


 


INR  0.99  (0.93-1.08)   05/05/18  09:40    


 


APTT  26.5 Seconds (25.1-36.5)   05/05/18  09:40    














- Constitutional


Appears: Non-toxic, No Acute Distress





- Head Exam


Head Exam: ATRAUMATIC, NORMOCEPHALIC





- Eye Exam


Eye Exam: Normal appearance





- ENT Exam


ENT Exam: Mucous Membranes Moist





- Cardiovascular Exam


Cardiovascular Exam: RRR





- GI/Abdominal Exam


GI & Abdominal Exam: Soft.  absent: Distended, Guarding, Rigid, Tenderness





- Neurological Exam


Neurological Exam: Alert, Awake, Oriented x3





- Skin


Skin Exam: Dry, Intact, Normal Color





Assessment and Plan





- Assessment and Plan (Free Text)


Assessment: 





58 yo F with gallstone pancreatitis


Plan: 





- LFTs and T bili trending down; continue to monitor


- Tolerating CLD; advance as per GI


- Serial abdominal exams 


- MRCP done today; negative


- Follow up lipase, amylase


- Plan for cholecystectomy once pancreatitis resolves


- Discussed with Dr. Molina Owens, PGY1

## 2018-05-07 NOTE — MRI
PROCEDURE:  Magnetic Resonance Cholangiopancreatography



HISTORY:

Rule out CBD stone 



COMPARISON:

05/05/2018.



TECHNIQUE:

Multiplanar, multisequence MR images of the abdomen were obtained, 

including heavily T2 weighted MRCP images of the biliary system. 

Rotating maximum intensity projection images of the biliary system 

were generated.



FINDINGS:



MRCP:

The common bile duct is of a normal caliber.  No evidence of 

choledocholithiasis. No intrahepatic biliary ductal dilatation.



LIVER:

SEVERE FATTY INFILTRATION OF THE LIVER.



GALLBLADDER:

Cholelithiasis.



SPLEEN:

Unremarkable.



PANCREAS:

 Unremarkable.



ADRENALS:

 Unremarkable.



KIDNEYS:

1 centimeter right upper pole renal cyst. .



AORTA:

No aneurysm.



ASCITES:

None.



OTHER FINDINGS:

None.



IMPRESSION:

No evidence of choledocholithiasis or biliary dilatation.

## 2018-05-07 NOTE — CP.PCM.PN
<Irina Interiano - Last Filed: 05/07/18 16:06>





Subjective





- Date & Time of Evaluation


Date of Evaluation: 05/07/18


Time of Evaluation: 12:10





- Subjective


Subjective: 





S&E at bedside, chart reviewed, No N/V or abdominal pain, improved, went for 

MRCP, reveal GB stone, severe fatty liver infiltration, pancreas unremarkable. 

No CBD stone. Tolerating clear liquid, No BM since admission, contributes to 

being on liquids. No acute overnight events.





Objective





- Vital Signs/Intake and Output


Vital Signs (last 24 hours): 


 











Temp Pulse Resp BP Pulse Ox


 


 98.1 F   68   20   133/69   98 


 


 05/07/18 12:00  05/07/18 12:00  05/07/18 12:00  05/07/18 12:00  05/07/18 06:00








Intake and Output: 


 











 05/07/18 05/07/18





 06:59 18:59


 


Intake Total 980 


 


Output Total 1300 


 


Balance -320 














- Medications


Medications: 


 Current Medications





Heparin Sodium (Porcine) (Heparin)  5,000 units SC Q12 [facility]


   PRN Reason: Protocol


   Last Admin: 05/07/18 10:09 Dose:  Not Given


Hydromorphone HCl (Dilaudid)  0.5 mg IVP Q6H PRN


   PRN Reason: Pain, severe (8-10)


Lactated Ringer's (Lactated Ringer's)  1,000 mls @ 80 mls/hr IV .[id number] [facility]


   Last Admin: 05/07/18 11:53 Dose:  80 mls/hr


Ondansetron HCl (Zofran Inj)  4 mg IVP Q4H PRN


   PRN Reason: Nausea/Vomiting











- Labs


Labs: 


 





 05/07/18 05:30 





 05/07/18 05:30 





 











PT  11.4 SECONDS (9.4-12.5)   05/05/18  09:40    


 


INR  0.99  (0.93-1.08)   05/05/18  09:40    


 


APTT  26.5 Seconds (25.1-36.5)   05/05/18  09:40    














- Head Exam


Head Exam: NORMOCEPHALIC





- Eye Exam


Eye Exam: Normal appearance.  absent: Scleral icterus





- ENT Exam


ENT Exam: Mucous Membranes Moist





- Neck Exam


Neck Exam: Normal Inspection





- Respiratory Exam


Respiratory Exam: Clear to Ausculation Bilateral, NORMAL BREATHING PATTERN.  

absent: Respiratory Distress





- Cardiovascular Exam


Cardiovascular Exam: +S1, +S2





- GI/Abdominal Exam


GI & Abdominal Exam: Soft, Normal Bowel Sounds.  absent: Guarding, Tenderness, 

Rebound





- Extremities Exam


Extremities Exam: absent: Calf Tenderness, Pedal Edema





- Neurological Exam


Neurological Exam: Alert, Awake, Oriented x3





- Skin


Skin Exam: Dry, Warm





Assessment and Plan





- Assessment and Plan (Free Text)


Assessment: 





ASSESSMENT:





Pancreatitis, maybe sencondary to gallstones


Elevated LFT


Fatty liver


Obesity


HTN


CKD








PLAN:





trend lft/lipase


clear liquids


DVT prophylaxsis/SCD


cholecystectomy when pancreatitis resolved as per surgery





Seen and discussed w/ Dr. Mack.





<Suzie Mack - Last Filed: 05/08/18 01:37>





Objective





- Vital Signs/Intake and Output


Vital Signs (last 24 hours): 


 











Temp Pulse Resp BP Pulse Ox


 


 97.8 F   69   20   127/56 L  97 


 


 05/08/18 00:01  05/08/18 00:01  05/08/18 00:01  05/08/18 00:01  05/08/18 00:01








Intake and Output: 


 











 05/07/18 05/08/18





 18:59 06:59


 


Intake Total 660 


 


Output Total 1500 


 


Balance -840 














- Medications


Medications: 


 Current Medications





Heparin Sodium (Porcine) (Heparin)  5,000 units SC Q12 [facility]


   PRN Reason: Protocol


   Last Admin: 05/07/18 21:57 Dose:  Not Given


Hydromorphone HCl (Dilaudid)  0.5 mg IVP Q6H PRN


   PRN Reason: Pain, severe (8-10)


Lactated Ringer's (Lactated Ringer's)  1,000 mls @ 80 mls/hr IV .[id number] [facility]


   Last Admin: 05/08/18 00:17 Dose:  Not Given


Ondansetron HCl (Zofran Inj)  4 mg IVP Q4H PRN


   PRN Reason: Nausea/Vomiting











- Labs


Labs: 


 





 05/07/18 05:30 





 05/07/18 05:30 





 











PT  11.4 SECONDS (9.4-12.5)   05/05/18  09:40    


 


INR  0.99  (0.93-1.08)   05/05/18  09:40    


 


APTT  26.5 Seconds (25.1-36.5)   05/05/18  09:40    














Attending/Attestation





- Attestation


I have personally seen and examined this patient.: Yes


I have fully participated in the care of the patient.: Yes


I have reviewed all pertinent clinical information, including history, physical 

exam and plan: Yes


Notes (Text): 


This is an addendum to GI progress  report dictated by  DOM Olsen The 

patient was seen and examined earlier.  Medical records, lab studies, imagings 

were reviewed.  Last 24 hours events reviewed.  Agreed with the above treatment 

plan as outlined in Medical Resident 's notes the with the addition of the 

following 


Patient is on clear liquid tolerating.  No complaints of abdominnal pain


MRCP was reviewed


LFT shows downward trend pancreatic enzyme shows downward trend 


Discussed with 


We will discuss with the surgical team


05/08/18 01:33

## 2018-05-08 LAB
ALBUMIN SERPL-MCNC: 4 G/DL (ref 3–4.8)
ALBUMIN/GLOB SERPL: 1.3 {RATIO} (ref 1.1–1.8)
ALT SERPL-CCNC: 296 U/L (ref 7–56)
AMYLASE SERPL-CCNC: 140 U/L (ref 35–125)
AST SERPL-CCNC: 62 U/L (ref 14–36)
BASOPHILS # BLD AUTO: 0.03 K/MM3 (ref 0–2)
BASOPHILS NFR BLD: 0.6 % (ref 0–3)
BUN SERPL-MCNC: 16 MG/DL (ref 7–21)
CALCIUM SERPL-MCNC: 9.4 MG/DL (ref 8.4–10.5)
EOSINOPHIL # BLD: 0.2 10*3/UL (ref 0–0.7)
EOSINOPHIL NFR BLD: 4.7 % (ref 1.5–5)
ERYTHROCYTE [DISTWIDTH] IN BLOOD BY AUTOMATED COUNT: 13.5 % (ref 11.5–14.5)
GFR NON-AFRICAN AMERICAN: 29
GRANULOCYTES # BLD: 2.44 10*3/UL (ref 1.4–6.5)
GRANULOCYTES NFR BLD: 49.8 % (ref 50–68)
HGB BLD-MCNC: 12.6 G/DL (ref 12–16)
LIPASE SERPL-CCNC: 449 U/L (ref 23–300)
LYMPHOCYTES # BLD: 1.7 10*3/UL (ref 1.2–3.4)
LYMPHOCYTES NFR BLD AUTO: 35.1 % (ref 22–35)
MCH RBC QN AUTO: 29.5 PG (ref 25–35)
MCHC RBC AUTO-ENTMCNC: 33 G/DL (ref 31–37)
MCV RBC AUTO: 89.5 FL (ref 80–105)
MONOCYTES # BLD AUTO: 0.5 10*3/UL (ref 0.1–0.6)
MONOCYTES NFR BLD: 9.8 % (ref 1–6)
PLATELET # BLD: 134 10^3/UL (ref 120–450)
PMV BLD AUTO: 12.4 FL (ref 7–11)
RBC # BLD AUTO: 4.27 10^6/UL (ref 3.5–6.1)
WBC # BLD AUTO: 4.9 10^3/UL (ref 4.5–11)

## 2018-05-08 NOTE — CP.PCM.PN
Subjective





- Date & Time of Evaluation


Date of Evaluation: 05/08/18


Time of Evaluation: 08:32





- Subjective


Subjective: 





Surgery: Dr. Auguste





Pt seen and examined. No acute overnight events. States she's feeling well this 

morning and doesn't have any abdominal pain. Pt wants to eat and is eager to go 

home. Denies N/V, F/C. 





Objective





- Vital Signs/Intake and Output


Vital Signs (last 24 hours): 


 











Temp Pulse Resp BP Pulse Ox


 


 98.4 F   64   20   123/74   97 


 


 05/08/18 06:00  05/08/18 06:00  05/08/18 06:00  05/08/18 06:00  05/08/18 06:00








Intake and Output: 


 











 05/08/18 05/08/18





 06:59 18:59


 


Intake Total 960 960


 


Output Total  2000


 


Balance 960 -1040














- Medications


Medications: 


 Current Medications





Heparin Sodium (Porcine) (Heparin)  5,000 units SC Q12 [facility]


   PRN Reason: Protocol


   Last Admin: 05/07/18 21:57 Dose:  Not Given


Hydromorphone HCl (Dilaudid)  0.5 mg IVP Q6H PRN


   PRN Reason: Pain, severe (8-10)


Lactated Ringer's (Lactated Ringer's)  1,000 mls @ 80 mls/hr IV .[id number] [facility]


   Last Admin: 05/08/18 06:15 Dose:  80 mls/hr


Ondansetron HCl (Zofran Inj)  4 mg IVP Q4H [facility]


   PRN Reason: Nausea/Vomiting











- Labs


Labs: 


 





 05/08/18 06:45 





 05/08/18 06:45 





 











PT  11.4 SECONDS (9.4-12.5)   05/05/18  09:40    


 


INR  0.99  (0.93-1.08)   05/05/18  09:40    


 


APTT  26.5 Seconds (25.1-36.5)   05/05/18  09:40    














- Constitutional


Appears: Well, No Acute Distress





- Head Exam


Head Exam: ATRAUMATIC, NORMOCEPHALIC





- Eye Exam


Eye Exam: Normal appearance





- ENT Exam


ENT Exam: Mucous Membranes Moist





- Respiratory Exam


Respiratory Exam: NORMAL BREATHING PATTERN





- Cardiovascular Exam


Cardiovascular Exam: RRR





- GI/Abdominal Exam


GI & Abdominal Exam: Soft.  absent: Distended, Guarding, Tenderness





- Neurological Exam


Neurological Exam: Alert, Awake, Oriented x3





- Skin


Skin Exam: Dry, Warm





Assessment and Plan





- Assessment and Plan (Free Text)


Assessment: 





57F with gallstone pancreatitis; resolving 


Plan: 





- ok to advance diet


- clear for DC from surgical standpoint as pt would like to get surgery 

electively


- f/u with Dr. Auguste in 1 week


- f/u GI recs 


- d/w Dr. Molina Uribe, PGY-3

## 2018-05-08 NOTE — CP.PCM.PN
Subjective





- Date & Time of Evaluation


Date of Evaluation: 05/08/18


Time of Evaluation: 10:00





- Subjective


Subjective: 





Patient reports feeling well; tolerating diet; no more pain; urinating 

frequently;





Objective





- Vital Signs/Intake and Output


Vital Signs (last 24 hours): 


 











Temp Pulse Resp BP Pulse Ox


 


 98.4 F   68   18   123/61   97 


 


 05/08/18 18:00  05/08/18 18:00  05/08/18 18:00  05/08/18 18:00  05/08/18 09:00








Intake and Output: 


 











 05/08/18 05/09/18





 18:59 06:59


 


Intake Total 1520 


 


Output Total 2800 


 


Balance -1280 














- Medications


Medications: 


 Current Medications





Heparin Sodium (Porcine) (Heparin)  5,000 units SC Q12 [facility]


   PRN Reason: Protocol


   Last Admin: 05/08/18 10:27 Dose:  Not Given


Hydromorphone HCl (Dilaudid)  0.5 mg IVP Q6H PRN


   PRN Reason: Pain, severe (8-10)


Ondansetron HCl (Zofran Inj)  4 mg IVP Q4H PRN


   PRN Reason: Nausea/Vomiting











- Labs


Labs: 


 





 05/08/18 06:45 





 05/08/18 06:45 





 











PT  11.4 SECONDS (9.4-12.5)   05/05/18  09:40    


 


INR  0.99  (0.93-1.08)   05/05/18  09:40    


 


APTT  26.5 Seconds (25.1-36.5)   05/05/18  09:40    














- Constitutional


Appears: Non-toxic, No Acute Distress





- Eye Exam


Eye Exam: absent: Scleral icterus





- ENT Exam


ENT Exam: Mucous Membranes Moist





- Respiratory Exam


Respiratory Exam: Clear to Ausculation Bilateral.  absent: Respiratory Distress





- Cardiovascular Exam


Cardiovascular Exam: RRR, +S1, +S2





- GI/Abdominal Exam


GI & Abdominal Exam: Soft.  absent: Distended, Tenderness





- Extremities Exam


Additional comments: 





no leg edema;





- Neurological Exam


Neurological Exam: Alert, Awake





- Psychiatric Exam


Psychiatric exam: Normal Mood.  absent: Agitated





- Skin


Skin Exam: Warm.  absent: Cyanosis





Assessment and Plan


(1) Acute pancreatitis


Assessment & Plan: 


Resolving; euvolemic on exam; patient tolerating diet well so we will d/c IVF;


Status: Acute   





(2) CKD (chronic kidney disease), stage IV


Assessment & Plan: 


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 

several years; will continue to monitor for increase in proteinuria;


Status: Chronic   





(3) HTN (hypertension)


Assessment & Plan: 


Normotensive off BP meds; will continue to hold valsartan, should be restarted 

as outpatient;


Status: Chronic

## 2018-05-08 NOTE — CP.PCM.PN
Subjective





- Date & Time of Evaluation


Date of Evaluation: 05/08/18


Time of Evaluation: 10:00





- Subjective


Subjective: 





S&E at bedside, chart reviewed, no acute overnight events, had formed BM 

yesterday, No GI bleeding. NO abdominal pain, N/V. No SOB or CP. No new 

complaints. Tolerated solids this am with no difficulty.





Objective





- Vital Signs/Intake and Output


Vital Signs (last 24 hours): 


 











Temp Pulse Resp BP Pulse Ox


 


 98.4 F   64   20   123/74   97 


 


 05/08/18 06:00  05/08/18 06:00  05/08/18 06:00  05/08/18 06:00  05/08/18 06:00








Intake and Output: 


 











 05/08/18 05/08/18





 06:59 18:59


 


Intake Total 960 960


 


Output Total  2000


 


Balance 960 -1040














- Medications


Medications: 


 Current Medications





Heparin Sodium (Porcine) (Heparin)  5,000 units SC Q12 DOMINIC


   PRN Reason: Protocol


   Last Admin: 05/08/18 10:27 Dose:  Not Given


Hydromorphone HCl (Dilaudid)  0.5 mg IVP Q6H PRN


   PRN Reason: Pain, severe (8-10)


Lactated Ringer's (Lactated Ringer's)  1,000 mls @ 80 mls/hr IV .[id number] [facility]


   Last Admin: 05/08/18 06:15 Dose:  80 mls/hr


Ondansetron HCl (Zofran Inj)  4 mg IVP Q4H PRN


   PRN Reason: Nausea/Vomiting











- Labs


Labs: 


 





 05/08/18 06:45 





 05/08/18 06:45 





 











PT  11.4 SECONDS (9.4-12.5)   05/05/18  09:40    


 


INR  0.99  (0.93-1.08)   05/05/18  09:40    


 


APTT  26.5 Seconds (25.1-36.5)   05/05/18  09:40    














- Constitutional


Appears: No Acute Distress





- Head Exam


Head Exam: NORMOCEPHALIC





- Eye Exam


Eye Exam: Normal appearance.  absent: Scleral icterus





- ENT Exam


ENT Exam: Mucous Membranes Moist





- Neck Exam


Neck Exam: Normal Inspection





- Respiratory Exam


Respiratory Exam: NORMAL BREATHING PATTERN.  absent: Respiratory Distress





- Cardiovascular Exam


Cardiovascular Exam: +S1, +S2





- GI/Abdominal Exam


GI & Abdominal Exam: Soft, Normal Bowel Sounds.  absent: Guarding, Tenderness, 

Organomegaly, Rebound





- Extremities Exam


Extremities Exam: Normal Capillary Refill.  absent: Calf Tenderness, Pedal Edema





- Neurological Exam


Neurological Exam: Alert, Awake, Oriented x3





- Skin


Skin Exam: Dry, Warm





Assessment and Plan





- Assessment and Plan (Free Text)


Assessment: 





ASSESSMENT:





Pancreatitis, maybe secondary to gallstones


Elevated LFT, slight increase noted today ? sludge,GB stone, fatty liver


Fatty liver


Obesity


HTN


CKD








PLAN:





trend lft/lipase


on mod carb lowfat diet


plan for EGD/EUS/ERCP 4/9/18: planned for today, patient ate initially  wanted 

done as outpatient, detail discussion w/ patient and , agree to have 

done inpt. Spoke to Dr. Oneil/sx team. 


NPO 12 midnight except meds, can have clear liquid breakfast


repeat labs in am, cmp,pt/inr


DVT prophylaxsis/SCD


benefit from cholecystectomy , timing as per surgery





Seen and discussed w/ Dr. Mack.

## 2018-05-08 NOTE — CP.PCM.PN
Subjective





- Date & Time of Evaluation


Date of Evaluation: 05/08/18


Time of Evaluation: 07:00





- Subjective


Subjective: 





Heme Onc Progress Note - Dr. Stanton





Patient was seen and examined at bedside. No acute complaints at this time. Pt 

expressed interest in advancing diet as well as going home.No acute or adverse 

events overnight as per nursing staff. Pt denied fever, chills, shhortness of 

breath, chest pains, abdominal pains, nausea, vomiting, diarrhea, constipation 

or dysuria. 





Objective





- Vital Signs/Intake and Output


Vital Signs (last 24 hours): 


 











Temp Pulse Resp BP Pulse Ox


 


 98.4 F   64   20   123/74   97 


 


 05/08/18 06:00  05/08/18 06:00  05/08/18 06:00  05/08/18 06:00  05/08/18 06:00








Intake and Output: 


 











 05/08/18 05/08/18





 06:59 18:59


 


Intake Total 960 960


 


Output Total  2000


 


Balance 960 -1040














- Medications


Medications: 


 Current Medications





Heparin Sodium (Porcine) (Heparin)  5,000 units SC Q12 [facility]


   PRN Reason: Protocol


   Last Admin: 05/07/18 21:57 Dose:  Not Given


Hydromorphone HCl (Dilaudid)  0.5 mg IVP Q6H PRN


   PRN Reason: Pain, severe (8-10)


Lactated Ringer's (Lactated Ringer's)  1,000 mls @ 80 mls/hr IV .[id number] [facility]


   Last Admin: 05/08/18 06:15 Dose:  80 mls/hr


Ondansetron HCl (Zofran Inj)  4 mg IVP Q4H PRN


   PRN Reason: Nausea/Vomiting











- Labs


Labs: 


 





 05/08/18 06:45 





 05/08/18 06:45 





 











PT  11.4 SECONDS (9.4-12.5)   05/05/18  09:40    


 


INR  0.99  (0.93-1.08)   05/05/18  09:40    


 


APTT  26.5 Seconds (25.1-36.5)   05/05/18  09:40    














- Constitutional


Appears: No Acute Distress





- Head Exam


Head Exam: ATRAUMATIC, NORMAL INSPECTION, NORMOCEPHALIC





- Eye Exam


Eye Exam: EOMI, Normal appearance, PERRL


Pupil Exam: NORMAL ACCOMODATION, PERRL





- ENT Exam


ENT Exam: Mucous Membranes Moist, Normal Exam





- Neck Exam


Neck Exam: Full ROM, Normal Inspection.  absent: Lymphadenopathy





- Respiratory Exam


Respiratory Exam: Clear to Ausculation Bilateral, NORMAL BREATHING PATTERN





- Cardiovascular Exam


Cardiovascular Exam: REGULAR RHYTHM, +S1, +S2.  absent: Murmur





- GI/Abdominal Exam


GI & Abdominal Exam: Soft, Normal Bowel Sounds.  absent: Tenderness





- Neurological Exam


Neurological Exam: Alert, Awake, CN II-XII Intact, Oriented x3





- Psychiatric Exam


Psychiatric exam: Normal Affect, Normal Mood





- Skin


Skin Exam: Dry, Intact, Normal Color, Warm





Assessment and Plan





- Assessment and Plan (Free Text)


Assessment: 





57 F with a PMHx of HTN, renal azotemia, CKD 3, light chain deposition disease s

/p chemotherapy completed 5 years ago admitted with gallstone pancreatitis. Pt 

LFTs and lipase downtrending, continue to monitor, serial abdominal exams, GI 

Dr Mack on board, fu with any reccs of possible EGD. Pt diet advanced as 

per surgery team to [facility], continue to advance as per surgery Dr Blu kulkarni. Fu with appointment for elective cholecystectomy. Plan for 

cholecystectomy when medically optimized as an outpt elective procedure.

## 2018-05-09 VITALS — HEART RATE: 71 BPM | DIASTOLIC BLOOD PRESSURE: 76 MMHG | SYSTOLIC BLOOD PRESSURE: 140 MMHG

## 2018-05-09 VITALS — OXYGEN SATURATION: 98 %

## 2018-05-09 VITALS — RESPIRATION RATE: 18 BRPM

## 2018-05-09 VITALS — TEMPERATURE: 98.9 F

## 2018-05-09 LAB
ALBUMIN SERPL-MCNC: 3.9 G/DL (ref 3–4.8)
ALBUMIN/GLOB SERPL: 1.2 {RATIO} (ref 1.1–1.8)
ALT SERPL-CCNC: 245 U/L (ref 7–56)
AST SERPL-CCNC: 49 U/L (ref 14–36)
BUN SERPL-MCNC: 20 MG/DL (ref 7–21)
CALCIUM SERPL-MCNC: 9.5 MG/DL (ref 8.4–10.5)
GFR NON-AFRICAN AMERICAN: 26
INR PPP: 0.96 (ref 0.93–1.08)
PROTHROMBIN TIME: 11 SECONDS (ref 9.4–12.5)

## 2018-05-09 PROCEDURE — 0DJ08ZZ INSPECTION OF UPPER INTESTINAL TRACT, VIA NATURAL OR ARTIFICIAL OPENING ENDOSCOPIC: ICD-10-PCS | Performed by: INTERNAL MEDICINE

## 2018-05-09 PROCEDURE — BD47ZZZ ULTRASONOGRAPHY OF GASTROINTESTINAL TRACT: ICD-10-PCS | Performed by: INTERNAL MEDICINE

## 2018-05-09 NOTE — CP.PCM.DIS
Provider





- Provider


Date of Admission: 


05/05/18 14:18





Attending physician: 


Donal Nunes MD





Primary care physician: 


Janie Wright MD





Consults: 





Nephro - Dr. Rose


GI Dr Martina Beck Onc Dr Stanton


Surgery Dr. Orr


Time Spent in preparation of Discharge (in minutes): 45





Diagnosis





- Discharge Diagnosis


(1) Acute pancreatitis


Status: Acute   





(2) Cholelithiasis


Status: Acute   





(3) CKD (chronic kidney disease), stage IV


Status: Chronic   





(4) HTN (hypertension)


Status: Chronic   





Hospital Course





- Lab Results


Lab Results: 


 Most Recent Lab Values











WBC  4.9 10^3/ul (4.5-11.0)   05/08/18  06:45    


 


RBC  4.27 10^6/uL (3.5-6.1)   05/08/18  06:45    


 


Hgb  12.6 g/dL (12.0-16.0)   05/08/18  06:45    


 


Hct  38.2 % (36.0-48.0)   05/08/18  06:45    


 


MCV  89.5 fl (80.0-105.0)   05/08/18  06:45    


 


MCH  29.5 pg (25.0-35.0)   05/08/18  06:45    


 


MCHC  33.0 g/dl (31.0-37.0)   05/08/18  06:45    


 


RDW  13.5 % (11.5-14.5)   05/08/18  06:45    


 


Plt Count  134 10^3/uL (120.0-450.0)   05/08/18  06:45    


 


MPV  12.4 fl (7.0-11.0)  H  05/08/18  06:45    


 


Gran %  49.8 % (50.0-68.0)  L  05/08/18  06:45    


 


Lymph % (Auto)  35.1 % (22.0-35.0)  H  05/08/18  06:45    


 


Mono % (Auto)  9.8 % (1.0-6.0)  H  05/08/18  06:45    


 


Eos % (Auto)  4.7 % (1.5-5.0)   05/08/18  06:45    


 


Baso % (Auto)  0.6 % (0.0-3.0)   05/08/18  06:45    


 


Gran #  2.44  (1.4-6.5)   05/08/18  06:45    


 


Lymph # (Auto)  1.7  (1.2-3.4)   05/08/18  06:45    


 


Mono # (Auto)  0.5  (0.1-0.6)   05/08/18  06:45    


 


Eos # (Auto)  0.2  (0.0-0.7)   05/08/18  06:45    


 


Baso # (Auto)  0.03 K/mm3 (0.0-2.0)   05/08/18  06:45    


 


PT  11.0 SECONDS (9.4-12.5)   05/09/18  06:00    


 


INR  0.96  (0.93-1.08)   05/09/18  06:00    


 


APTT  26.5 Seconds (25.1-36.5)   05/05/18  09:40    


 


Sodium  146 mmol/L (132-148)   05/09/18  06:00    


 


Potassium  3.8 mmol/L (3.6-5.0)   05/09/18  06:00    


 


Chloride  110 mmol/L ()  H  05/09/18  06:00    


 


Carbon Dioxide  22 mmol/L (21-33)   05/09/18  06:00    


 


Anion Gap  17  (10-20)   05/09/18  06:00    


 


BUN  20 mg/dL (7-21)   05/09/18  06:00    


 


Creatinine  2.0 mg/dl (0.7-1.2)  H  05/09/18  06:00    


 


Est GFR ( Amer)  31   05/09/18  06:00    


 


Est GFR (Non-Af Amer)  26   05/09/18  06:00    


 


Random Glucose  114 mg/dL ()  H  05/09/18  06:00    


 


Calcium  9.5 mg/dL (8.4-10.5)   05/09/18  06:00    


 


Phosphorus  2.1 mg/dL (2.5-4.5)  L  05/07/18  05:30    


 


Magnesium  2.0 mg/dL (1.7-2.2)   05/07/18  05:30    


 


Total Bilirubin  0.8 mg/dL (0.2-1.3)   05/09/18  06:00    


 


AST  49 U/L (14-36)  H D 05/09/18  06:00    


 


ALT  245 U/L (7-56)  H  05/09/18  06:00    


 


Alkaline Phosphatase  145 U/L ()  H  05/09/18  06:00    


 


Lactate Dehydrogenase  646 U/L (333-699)   05/06/18  10:27    


 


Total Protein  7.1 g/dL (5.8-8.3)   05/09/18  06:00    


 


Albumin  3.9 g/dL (3.0-4.8)   05/09/18  06:00    


 


Globulin  3.2 gm/dL  05/09/18  06:00    


 


Albumin/Globulin Ratio  1.2  (1.1-1.8)   05/09/18  06:00    


 


Triglycerides  97 mg/dL ()   05/05/18  09:00    


 


Cholesterol  221 mg/dL (130-200)  H  05/05/18  09:00    


 


LDL Cholesterol Direct  75 mg/dL (0-129)   05/05/18  09:00    


 


HDL Cholesterol  96 mg/dL (29-60)  H  05/05/18  09:00    


 


Amylase  140 U/L ()  H  05/08/18  06:45    


 


Lipase  449 U/L ()  H  05/08/18  06:45    


 


Urine Color  Yellow  (YELLOW)   05/05/18  09:40    


 


Urine Appearance  Clear  (CLEAR)   05/05/18  09:40    


 


Urine pH  7.0  (4.7-8.0)   05/05/18  09:40    


 


Ur Specific Gravity  1.015  (1.005-1.035)   05/05/18  09:40    


 


Urine Protein  30 mg/dL (<30 mg/dL)  H  05/05/18  09:40    


 


Urine Glucose (UA)  250 mg/dL (NEGATIVE)  H  05/05/18  09:40    


 


Urine Ketones  Negative mg/dL (NEGATIVE)   05/05/18  09:40    


 


Urine Blood  Trace-intact  (NEGATIVE)  H  05/05/18  09:40    


 


Urine Nitrate  Negative  (NEGATIVE)   05/05/18  09:40    


 


Urine Bilirubin  Small  (NEGATIVE)  H  05/05/18  09:40    


 


Urine Urobilinogen  0.2 E.U./dL (<1 E.U./dL)   05/05/18  09:40    


 


Ur Leukocyte Esterase  Negative Everardo/uL (NEGATIVE)   05/05/18  09:40    


 


Urine RBC  0 - 2 /hpf (0-2)   05/05/18  09:40    


 


Urine WBC  0 - 2 /hpf (0-6)   05/05/18  09:40    


 


Ur Epithelial Cells  0 - 2 /hpf (0-5)   05/05/18  09:40    


 


Urine Bacteria  Few  (NEG)   05/05/18  09:40    














- Hospital Course


Hospital Course: 





Pt is a 57F with PMHx significant for HTN, DM (diet controlled) & light chain 

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 

[facility] for abdominal pain x 4 days. Pt states she started having RUQ abdominal 

pain on wednesday with episodes of nausea. She noticed pain was more noticeable 

after eating. As the week progressed her pain got worse and she wasn't able to 

keep anything down. This morning she had severe pain which radiated to the R 

flank and epigastric regions so she came to the ER. 


In the ER, pt was found to have elevated liver enzymes as well as lipase of 89,

000. CT abdomen/pelvis was obtained and showed evidence of gallstones with 

pancreatitis. Surgery called to evaluate. Currently, pt is resting comfortably 

in bed. States her pain is slightly better but still present. She denies any 

episodes of vomiting but admits to nausea. Pt had SOLOMON on CKD with relative 

increase of Cr from baseline of 1.9 to 2.2 with a likely prerenal etiology in 

the setting of decreased po intake 2/2 acute pancreatitis. Pt was plcaed on 

fluids with a postiive fluid balance . Pt remained normotensive during her 

stay. Pt was evaluated by Surgery, and as per all parties agreed to outpatient 

elective cholecystectomy. Pt is followed by Heme onc for light chain deposition 

disease for which she is monitored every 6 weeks. GI Dr Mack recommended EGD

/EUS/ERCP which was performed and recommended out pt fu. Pt recommended a mod 

carb low fat diet. Upon DC pt is to FU PMD, GI, and Heme Onc. 





Discharge Exam





- Head Exam


Head Exam: NORMOCEPHALIC





- Eye Exam


Eye Exam: EOMI, Normal appearance, PERRL


Pupil Exam: NORMAL ACCOMODATION, PERRL





- ENT Exam


ENT Exam: Mucous Membranes Moist





- Respiratory Exam


Respiratory Exam: Clear to PA & Lateral, NORMAL BREATHING PATTERN, UNREMARKABLE





- Cardiovascular Exam


Cardiovascular Exam: REGULAR RHYTHM, +S1, +S2





- GI/Abdominal Exam


GI & Abdominal Exam: Normal Bowel Sounds, Soft.  absent: Tenderness





- Neurological Exam


Neurological exam: Alert, CN II-XII Intact, Oriented x3, Reflexes Normal





- Psychiatric Exam


Psychiatric exam: Normal Affect, Normal Mood





- Skin


Skin Exam: Dry, Intact, Normal Color, Warm





Discharge Plan





- Follow Up Plan


Condition: FAIR


Disposition: HOME/ ROUTINE


Additional Instructions: 


1. Pt is to Fu with Surgery for elective cholecystectomy 


2. Pt is to follow a low fat, low residual healthy diet


3. pt is ok to restart home meds,  take not eof stopping any medications prior 

to elective procedure as per Surgery


4. pt is to FU with heme onc outpt


5. Pt welcome to return to [facility] ED if symptoms change or worsen


Referrals: 


Janie Wright MD [Primary Care Provider] -

## 2018-05-09 NOTE — CP.PCM.PN
Subjective





- Date & Time of Evaluation


Date of Evaluation: 05/09/18


Time of Evaluation: 09:30





- Subjective


Subjective: 





Surgery progress note for Dr. Auguste





Patient seen and examined at bedside. Per nursing staff, no acute events 

overnight. Patient is currently NPO for scheduled EGD/EUS today. She denies any 

abdominal pain, nausea, or vomiting. Tolerated diet yesterday. Denies fever, 

chills, chest pain, or shortness of breath.





Objective





- Vital Signs/Intake and Output


Vital Signs (last 24 hours): 


 











Temp Pulse Resp BP Pulse Ox


 


 98.7 F   73   18   130/66   98 


 


 05/09/18 06:00  05/09/18 06:00  05/09/18 06:00  05/09/18 06:00  05/09/18 06:00








Intake and Output: 


 











 05/09/18 05/09/18





 06:59 18:59


 


Intake Total 0 


 


Output Total 350 


 


Balance -350 














- Medications


Medications: 


 Current Medications





Heparin Sodium (Porcine) (Heparin)  5,000 units SC Q12 [facility]


   PRN Reason: Protocol


   Last Admin: 05/08/18 21:31 Dose:  Not Given


Hydromorphone HCl (Dilaudid)  0.5 mg IVP Q6H PRN


   PRN Reason: Pain, severe (8-10)


Ondansetron HCl (Zofran Inj)  4 mg IVP Q4H PRN


   PRN Reason: Nausea/Vomiting











- Labs


Labs: 


 





 05/08/18 06:45 





 05/09/18 06:00 





 











PT  11.0 SECONDS (9.4-12.5)   05/09/18  06:00    


 


INR  0.96  (0.93-1.08)   05/09/18  06:00    


 


APTT  26.5 Seconds (25.1-36.5)   05/05/18  09:40    














- Constitutional


Appears: Non-toxic, No Acute Distress





- Eye Exam


Eye Exam: Normal appearance





- ENT Exam


ENT Exam: Mucous Membranes Moist





- Neck Exam


Neck Exam: Normal Inspection





- Respiratory Exam


Respiratory Exam: NORMAL BREATHING PATTERN





- Cardiovascular Exam


Cardiovascular Exam: REGULAR RHYTHM





- GI/Abdominal Exam


GI & Abdominal Exam: Soft.  absent: Distended, Firm, Guarding, Rigid, Tenderness





- Extremities Exam


Extremities Exam: Normal Inspection





- Neurological Exam


Neurological Exam: Alert, Awake





Assessment and Plan





- Assessment and Plan (Free Text)


Assessment: 





58 yo F with gallstone pancreatitis; resolving


Plan: 





-- Currently NPO for EGD/EUS, possible ERCP


-- Advance diet after procedure, pending GI recs


-- Plan for outpatient cholecystectomy and patient would like to schedule 

procedure electively


-- Discussed with Dr. Molina Raines Amine PGY1

## 2018-05-09 NOTE — CP.PCM.PN
Objective





- Vital Signs/Intake and Output


Vital Signs (last 24 hours): 


 











Temp Pulse Resp BP Pulse Ox


 


 98.9 F   71   18   140/76   98 


 


 05/09/18 14:15  05/09/18 14:15  05/09/18 14:15  05/09/18 14:15  05/09/18 14:15











- Labs


Labs: 


 





 05/08/18 06:45 





 05/09/18 06:00 





 











PT  11.0 SECONDS (9.4-12.5)   05/09/18  06:00    


 


INR  0.96  (0.93-1.08)   05/09/18  06:00    


 


APTT  26.5 Seconds (25.1-36.5)   05/05/18  09:40    














Assessment and Plan


(1) Acute pancreatitis


Status: Acute   





(2) CKD (chronic kidney disease), stage IV


Status: Chronic   





(3) HTN (hypertension)


Status: Chronic

## 2018-05-28 ENCOUNTER — HOSPITAL ENCOUNTER (INPATIENT)
Dept: HOSPITAL 42 - ED | Age: 58
LOS: 2 days | Discharge: HOME | DRG: 493 | End: 2018-05-30
Attending: SURGERY | Admitting: SURGERY
Payer: MEDICAID

## 2018-05-28 VITALS — BODY MASS INDEX: 33.3 KG/M2

## 2018-05-28 DIAGNOSIS — I12.9: ICD-10-CM

## 2018-05-28 DIAGNOSIS — Z92.21: ICD-10-CM

## 2018-05-28 DIAGNOSIS — E72.09: ICD-10-CM

## 2018-05-28 DIAGNOSIS — N18.4: ICD-10-CM

## 2018-05-28 DIAGNOSIS — Z87.891: ICD-10-CM

## 2018-05-28 DIAGNOSIS — K80.10: Primary | ICD-10-CM

## 2018-05-28 DIAGNOSIS — K85.10: ICD-10-CM

## 2018-05-28 DIAGNOSIS — E11.22: ICD-10-CM

## 2018-05-28 DIAGNOSIS — Z88.5: ICD-10-CM

## 2018-05-28 DIAGNOSIS — M89.9: ICD-10-CM

## 2018-05-28 DIAGNOSIS — N39.0: ICD-10-CM

## 2018-05-28 DIAGNOSIS — K21.9: ICD-10-CM

## 2018-05-28 DIAGNOSIS — K76.0: ICD-10-CM

## 2018-05-28 DIAGNOSIS — Z82.49: ICD-10-CM

## 2018-05-28 DIAGNOSIS — E66.9: ICD-10-CM

## 2018-05-28 DIAGNOSIS — Z87.81: ICD-10-CM

## 2018-05-28 LAB
ALBUMIN SERPL-MCNC: 4.2 G/DL (ref 3–4.8)
ALBUMIN/GLOB SERPL: 1.3 {RATIO} (ref 1.1–1.8)
ALT SERPL-CCNC: 179 U/L (ref 7–56)
APPEARANCE UR: (no result)
APTT BLD: 25.3 SECONDS (ref 25.1–36.5)
AST SERPL-CCNC: 128 U/L (ref 14–36)
BACTERIA #/AREA URNS HPF: (no result) /[HPF]
BASOPHILS # BLD AUTO: 0.03 K/MM3 (ref 0–2)
BASOPHILS NFR BLD: 0.8 % (ref 0–3)
BILIRUB UR-MCNC: NEGATIVE MG/DL
BUN SERPL-MCNC: 31 MG/DL (ref 7–21)
CALCIUM SERPL-MCNC: 9.6 MG/DL (ref 8.4–10.5)
COLOR UR: YELLOW
EOSINOPHIL # BLD: 0.2 10*3/UL (ref 0–0.7)
EOSINOPHIL NFR BLD: 3.9 % (ref 1.5–5)
ERYTHROCYTE [DISTWIDTH] IN BLOOD BY AUTOMATED COUNT: 13 % (ref 11.5–14.5)
GFR NON-AFRICAN AMERICAN: 23
GLUCOSE UR STRIP-MCNC: 250 MG/DL
GRANULOCYTES # BLD: 2.01 10*3/UL (ref 1.4–6.5)
GRANULOCYTES NFR BLD: 51.8 % (ref 50–68)
HGB BLD-MCNC: 13.6 G/DL (ref 12–16)
INR PPP: 0.96 (ref 0.93–1.08)
LEUKOCYTE ESTERASE UR-ACNC: (no result) LEU/UL
LIPASE SERPL-CCNC: 1054 U/L (ref 23–300)
LYMPHOCYTES # BLD: 1.4 10*3/UL (ref 1.2–3.4)
LYMPHOCYTES NFR BLD AUTO: 35.8 % (ref 22–35)
MCH RBC QN AUTO: 29.6 PG (ref 25–35)
MCHC RBC AUTO-ENTMCNC: 33.3 G/DL (ref 31–37)
MCV RBC AUTO: 88.7 FL (ref 80–105)
MONOCYTES # BLD AUTO: 0.3 10*3/UL (ref 0.1–0.6)
MONOCYTES NFR BLD: 7.7 % (ref 1–6)
PH UR STRIP: 6 [PH] (ref 4.7–8)
PLATELET # BLD: 173 10^3/UL (ref 120–450)
PMV BLD AUTO: 12.3 FL (ref 7–11)
PROT UR STRIP-MCNC: 30 MG/DL
PROTHROMBIN TIME: 11 SECONDS (ref 9.4–12.5)
RBC # BLD AUTO: 4.6 10^6/UL (ref 3.5–6.1)
RBC # UR STRIP: (no result) /UL
RBC #/AREA URNS HPF: (no result) /HPF (ref 0–2)
SP GR UR STRIP: 1.02 (ref 1–1.03)
TROPONIN I SERPL-MCNC: < 0.01 NG/ML
UROBILINOGEN UR STRIP-ACNC: 0.2 E.U./DL
WBC # BLD AUTO: 3.9 10^3/UL (ref 4.5–11)

## 2018-05-28 RX ADMIN — DEXTROSE AND SODIUM CHLORIDE SCH MLS/HR: 5; 450 INJECTION, SOLUTION INTRAVENOUS at 21:35

## 2018-05-28 RX ADMIN — DEXTROSE AND SODIUM CHLORIDE SCH MLS/HR: 5; 450 INJECTION, SOLUTION INTRAVENOUS at 11:44
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
## 2018-05-29 VITALS — RESPIRATION RATE: 20 BRPM

## 2018-05-29 LAB
ALBUMIN SERPL-MCNC: 3.6 G/DL (ref 3–4.8)
ALBUMIN/GLOB SERPL: 1.2 {RATIO} (ref 1.1–1.8)
ALT SERPL-CCNC: 122 U/L (ref 7–56)
AMYLASE SERPL-CCNC: 96 U/L (ref 35–125)
APTT BLD: 24.5 SECONDS (ref 25.1–36.5)
AST SERPL-CCNC: 45 U/L (ref 14–36)
BASOPHILS # BLD AUTO: 0.04 K/MM3 (ref 0–2)
BASOPHILS NFR BLD: 1 % (ref 0–3)
BUN SERPL-MCNC: 20 MG/DL (ref 7–21)
CALCIUM SERPL-MCNC: 9.2 MG/DL (ref 8.4–10.5)
EOSINOPHIL # BLD: 0.3 10*3/UL (ref 0–0.7)
EOSINOPHIL NFR BLD: 6.3 % (ref 1.5–5)
ERYTHROCYTE [DISTWIDTH] IN BLOOD BY AUTOMATED COUNT: 13.1 % (ref 11.5–14.5)
GFR NON-AFRICAN AMERICAN: 26
GRANULOCYTES # BLD: 1.62 10*3/UL (ref 1.4–6.5)
GRANULOCYTES NFR BLD: 39.4 % (ref 50–68)
HGB BLD-MCNC: 12.3 G/DL (ref 12–16)
INR PPP: 0.98 (ref 0.93–1.08)
LIPASE SERPL-CCNC: 269 U/L (ref 23–300)
LYMPHOCYTES # BLD: 1.8 10*3/UL (ref 1.2–3.4)
LYMPHOCYTES NFR BLD AUTO: 44.3 % (ref 22–35)
MCH RBC QN AUTO: 29.3 PG (ref 25–35)
MCHC RBC AUTO-ENTMCNC: 32.8 G/DL (ref 31–37)
MCV RBC AUTO: 89.3 FL (ref 80–105)
MONOCYTES # BLD AUTO: 0.4 10*3/UL (ref 0.1–0.6)
MONOCYTES NFR BLD: 9 % (ref 1–6)
PLATELET # BLD: 150 10^3/UL (ref 120–450)
PMV BLD AUTO: 12.2 FL (ref 7–11)
PROTHROMBIN TIME: 11.3 SECONDS (ref 9.4–12.5)
RBC # BLD AUTO: 4.2 10^6/UL (ref 3.5–6.1)
WBC # BLD AUTO: 4.1 10^3/UL (ref 4.5–11)

## 2018-05-29 RX ADMIN — DEXTROSE AND SODIUM CHLORIDE SCH MLS/HR: 5; 450 INJECTION, SOLUTION INTRAVENOUS at 15:55

## 2018-05-29 NOTE — CP.PCM.PN
Subjective





- Date & Time of Evaluation


Date of Evaluation: 05/29/18


Time of Evaluation: 13:30





- Subjective


Subjective: 





[facility] Progress Note - Dr. Stanton





Patient was seen and examined at bedside. No acute complaints at this time. No 

acute or adverse events overnight as per nursing staff. Patient states her 

abdominal pain has improved since admission. Pt denied fever, chills, 

shhortness of breath, chest pains, nausea, vomiting, diarrhea, constipation or 

dysuria. 








Objective





- Vital Signs/Intake and Output


Vital Signs (last 24 hours): 


 











Temp Pulse Resp BP Pulse Ox


 


 98.3 F   77   20   121/78   98 


 


 05/29/18 14:00  05/29/18 14:00  05/29/18 14:00  05/29/18 14:00  05/29/18 14:00








Intake and Output: 


 











 05/29/18 05/29/18





 06:59 18:59


 


Intake Total 300 480


 


Balance 300 480














- Medications


Medications: 


 Current Medications





Acetaminophen (Tylenol 325mg Tab)  650 mg PO Q4H PRN


   PRN Reason: Pain, Mild (1-3)


Dextrose/Sodium Chloride (Dextrose 5%/0.9% Ns 1000 Ml)  1,000 mls @ 100 mls/hr 

IV .Q10H [facility]


   Last Admin: 05/29/18 16:39 Dose:  100 mls/hr


Losartan Potassium (Cozaar)  50 mg PO DAILY [facility]


   Last Admin: 05/29/18 09:42 Dose:  Not Given


Ondansetron HCl (Zofran Inj)  4 mg IVP Q6 PRN


   PRN Reason: Nausea/Vomiting


Pantoprazole Sodium (Protonix Inj)  40 mg IVP DAILY [facility]


   Last Admin: 05/29/18 09:43 Dose:  Not Given











- Labs


Labs: 


 





 05/29/18 06:30 





 05/29/18 06:30 





 











PT  11.3 SECONDS (9.4-12.5)   05/29/18  06:30    


 


INR  0.98  (0.93-1.08)   05/29/18  06:30    


 


APTT  24.5 Seconds (25.1-36.5)  L  05/29/18  06:30    














- Constitutional


Appears: Well





- Head Exam


Head Exam: ATRAUMATIC, NORMAL INSPECTION, NORMOCEPHALIC





- Eye Exam


Eye Exam: EOMI, Normal appearance, PERRL


Pupil Exam: NORMAL ACCOMODATION, PERRL





- ENT Exam


ENT Exam: Mucous Membranes Moist, Normal Exam





- Respiratory Exam


Respiratory Exam: Clear to Ausculation Bilateral, NORMAL BREATHING PATTERN





- Cardiovascular Exam


Cardiovascular Exam: REGULAR RHYTHM, +S1, +S2.  absent: Murmur





- GI/Abdominal Exam


GI & Abdominal Exam: Soft, Normal Bowel Sounds.  absent: Tenderness





- Neurological Exam


Neurological Exam: Alert, Awake, CN II-XII Intact, Normal Gait, Oriented x3





- Psychiatric Exam


Psychiatric exam: Normal Affect, Normal Mood





- Skin


Skin Exam: Dry, Intact, Normal Color, Warm





Assessment and Plan





- Assessment and Plan (Free Text)


Assessment: 





57 F with a PMHx of HTN, renal azotemia, CKD 3, light chain deposition disease s

/p chemotherapy completed 5 years ago admitted with symptomatic cholethiasis 

previously admitted roughly 3 weeks ago with gallstone pancreatitis. Fu lipase 

and amylase as amylase is more acute phase indicator. continue to monitor. GI 

Dr Makc consulted, recommended to continue trend LFTs, as they are 

downtrending currently, recommended low fat diet and NPO after midnight for OR 

tomorrow morning as per surgery Dr. Terry. Continue PPI. Pt to fu with Dr Stanton as outpt. once medically stabilized.

## 2018-05-29 NOTE — CARD
--------------- APPROVED REPORT --------------





EKG Measurement

Heart [id number]

MT [id number]

[id number]

[id number]

[id number]



<Conclusion>

Normal sinus rhythm

Possible Left atrial enlargement

Borderline ECG

## 2018-05-29 NOTE — RAD
HISTORY:

Pre-op  



COMPARISON:

01/31/2018 



FINDINGS:



LUNGS:

No active pulmonary disease.



PLEURA:

No significant pleural effusion identified, no pneumothorax apparent.



CARDIOVASCULAR:

 No radiographic findings to suggest acute or significant 

cardiovascular disease. PICC line in satisfactory position unchanged 

compared to the prior study.



OSSEOUS STRUCTURES:

No significant abnormalities.



VISUALIZED UPPER ABDOMEN:

Normal.



OTHER FINDINGS:

None.



IMPRESSION:

No active disease. No significant interval change compared to the 

prior examination(s).
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
## 2018-05-29 NOTE — CON
DATE:  05/28/2018



This is [facility] visit on the medical floor.



For Dr. Stanton.



CHIEF COMPLAINT:  Right upper quadrant pain.



HISTORY OF PRESENT ILLNESS:  The patient is a 57-year-old female with

Fanconi syndrome, light chain deposition disease for which she sees Dr. Rose, renal consultant, admitted for evaluation of severe right upper

quadrant pain, known to have an episode with cholelithiasis approximately 3

weeks prior, and now worsening pain necessitating admission.  With this,

the patient is now feeling better after analgesics were given for her pain,

with the patient otherwise to be cleared as per Dr. Rose, her renal

consultant, prior to procedure as indicated.



ALLERGIES:  CODEINE.



PAST MEDICAL HISTORY:  Significant for light chain deposition disease,

Fanconi syndrome, with chronic kidney disease, hypertension, obesity,

cholelithiasis, and diet-controlled diabetes.



SOCIAL HISTORY:  Positive smoker for 20 years, quit 3 years prior.  Denies

EtOH abuse.



MEDICATIONS:  Include Cozaar 50 mg once a day, Protonix, and Zofran.  No

medicines for her diabetes.



REVIEW OF SYSTEMS:  Twelve-point review of systems is done which is

negative to questioning except for items mentioned in the history of

present illness.



PHYSICAL EXAMINATION:

VITAL SIGNS:  Temperature 98, pulse 83, respirations 18, blood pressure

142/71, pulse ox 97%.

HEENT:  Unremarkable.

NECK:  Supple.

HEART:  Regular rate.

LUNGS:  Clear.

ABDOMEN:  Soft.  Minimal tenderness to right upper quadrant to gentle

palpation.

EXTREMITIES:  No edema.

SKIN:  Warm and dry.

NEUROLOGIC:  Awake, alert and oriented x3.



LABORATORY DATA:  The patient's labs were done.  White blood cell count of

3.9, hemoglobin 13.6, hematocrit of 40.8, platelet count of 173,000, with a

chem metabolic panel showing a BUN of 31, with a creatinine of 2.2.  AST of

128, ALT of 179, total bilirubin of 0.7, with a lipase today of 1054.  INR

of 0.96; with a urine showing trace of blood, 250 mg/dL of glucose, and 30

g/dL of protein.



The patient had an EKG done, it was not read yet.  She had a gallbladder

ultrasound done today which showed cholelithiasis, liver is measured 15 cm

on current exam, was enlarged at 19 cm on prior exam 3 weeks prior.  The

latter represents a more accurate measurement.  Mild fatty hepatic

infiltration.



The patient also had testing done by Dr. Mack, done on 05/11/2018,

showing an ERCP with the patient noted to have gastritis, periampullary

diverticulum with gallstones.



ASSESSMENT:  For this patient is that of abdominal pain, acute

cholecystitis, light chain deposition disease, Fanconi syndrome, chronic

kidney disease, diabetes mellitus, diet-controlled; hypertension, obesity

and biliary colic.



PLAN:  Plan for this patient after conservation with Dr. Stanton and Dr. Mack is to ask for consult with Dr. Rose, her renal consultant, with

plan laparoscopic cholecystectomy as per Dr. Terry, surgeon, as indicated

once she is cleared for the procedure.  We will monitor clinically and with

labs.



This is a complex patient with a comprehensive medically necessary and

appropriate visit carried out in excess of 30 minutes face-to-face time

with the patient with testing done as above.  Prognosis for this patient

guarded.





__________________________________________

Donal Nunes MD



DD:  05/28/2018 15:38:43

DT:  05/28/2018 20:11:51

Job # [id number]

## 2018-05-29 NOTE — CP.PCM.PN
Subjective





- Date & Time of Evaluation


Date of Evaluation: 05/29/18


Time of Evaluation: 07:27





- Subjective


Subjective: 





Surgery: Dr. Terry





Pt seen and examined. No acute overnight events. States she's feeling better 

and would like to eat if she's not going to the OR. Denies N/V, F/C. 





Objective





- Vital Signs/Intake and Output


Vital Signs (last 24 hours): 


 











Temp Pulse Resp BP Pulse Ox


 


 98.1 F   69   16   144/67   96 


 


 05/28/18 22:33  05/28/18 22:33  05/28/18 22:33  05/28/18 22:33  05/28/18 22:33








Intake and Output: 


 











 05/29/18 05/29/18





 06:59 18:59


 


Intake Total 300 


 


Balance 300 














- Medications


Medications: 


 Current Medications





Acetaminophen (Tylenol 325mg Tab)  650 mg PO Q4H PRN


   PRN Reason: Pain, Mild (1-3)


Dextrose/Sodium Chloride (Dextrose 5%/0.45% Ns 1000 Ml)  1,000 mls @ 100 mls/hr 

IV .Q10H [facility]


   Last Admin: 05/28/18 21:35 Dose:  100 mls/hr


Losartan Potassium (Cozaar)  50 mg PO DAILY [facility]


Ondansetron HCl (Zofran Inj)  4 mg IVP Q6 PRN


   PRN Reason: Nausea/Vomiting


Pantoprazole Sodium (Protonix Inj)  40 mg IVP DAILY [facility]











- Labs


Labs: 


 





 05/29/18 06:30 





 05/29/18 06:30 





 











PT  11.3 SECONDS (9.4-12.5)   05/29/18  06:30    


 


INR  0.98  (0.93-1.08)   05/29/18  06:30    


 


APTT  24.5 Seconds (25.1-36.5)  L  05/29/18  06:30    














- Constitutional


Appears: Well, No Acute Distress





- Head Exam


Head Exam: ATRAUMATIC, NORMOCEPHALIC





- Eye Exam


Eye Exam: Normal appearance





- ENT Exam


ENT Exam: Mucous Membranes Moist





- Respiratory Exam


Respiratory Exam: NORMAL BREATHING PATTERN





- Cardiovascular Exam


Cardiovascular Exam: RRR





- GI/Abdominal Exam


GI & Abdominal Exam: Soft.  absent: Distended, Tenderness





- Neurological Exam


Neurological Exam: Alert, Awake, Oriented x3





- Skin


Skin Exam: Dry, Warm





Assessment and Plan





- Assessment and Plan (Free Text)


Assessment: 





57F with symptomatic cholelithiasis 


Plan: 





- plan for OR tomorrow 


- Keep NPO after midnight


- Ok to give diet today


- d/w Dr. Harrison Uribe, PGY-3

## 2018-05-30 VITALS
TEMPERATURE: 98.3 F | OXYGEN SATURATION: 95 % | HEART RATE: 80 BPM | DIASTOLIC BLOOD PRESSURE: 71 MMHG | SYSTOLIC BLOOD PRESSURE: 125 MMHG

## 2018-05-30 LAB
ALBUMIN SERPL-MCNC: 3.9 G/DL (ref 3–4.8)
ALBUMIN/GLOB SERPL: 1.2 {RATIO} (ref 1.1–1.8)
ALT SERPL-CCNC: 94 U/L (ref 7–56)
AST SERPL-CCNC: 30 U/L (ref 14–36)
BASOPHILS # BLD AUTO: 0.05 K/MM3 (ref 0–2)
BASOPHILS NFR BLD: 1.1 % (ref 0–3)
BUN SERPL-MCNC: 19 MG/DL (ref 7–21)
CALCIUM SERPL-MCNC: 9.1 MG/DL (ref 8.4–10.5)
EOSINOPHIL # BLD: 0.3 10*3/UL (ref 0–0.7)
EOSINOPHIL NFR BLD: 5.4 % (ref 1.5–5)
ERYTHROCYTE [DISTWIDTH] IN BLOOD BY AUTOMATED COUNT: 12.9 % (ref 11.5–14.5)
GFR NON-AFRICAN AMERICAN: 27
GRANULOCYTES # BLD: 1.98 10*3/UL (ref 1.4–6.5)
GRANULOCYTES NFR BLD: 42.4 % (ref 50–68)
HGB BLD-MCNC: 13.9 G/DL (ref 12–16)
LYMPHOCYTES # BLD: 2.1 10*3/UL (ref 1.2–3.4)
LYMPHOCYTES NFR BLD AUTO: 44.5 % (ref 22–35)
MCH RBC QN AUTO: 30.1 PG (ref 25–35)
MCHC RBC AUTO-ENTMCNC: 33.6 G/DL (ref 31–37)
MCV RBC AUTO: 89.6 FL (ref 80–105)
MONOCYTES # BLD AUTO: 0.3 10*3/UL (ref 0.1–0.6)
MONOCYTES NFR BLD: 6.6 % (ref 1–6)
PLATELET # BLD: 128 10^3/UL (ref 120–450)
PMV BLD AUTO: 12.1 FL (ref 7–11)
RBC # BLD AUTO: 4.62 10^6/UL (ref 3.5–6.1)
WBC # BLD AUTO: 4.7 10^3/UL (ref 4.5–11)

## 2018-05-30 PROCEDURE — 0FT44ZZ RESECTION OF GALLBLADDER, PERCUTANEOUS ENDOSCOPIC APPROACH: ICD-10-PCS | Performed by: SURGERY

## 2018-05-30 NOTE — CP.PCM.PN
Objective





- Vital Signs/Intake and Output


Vital Signs (last 24 hours): 


 











Temp Pulse Resp BP Pulse Ox


 


 98.3 F   80   20   125/71   95 


 


 05/30/18 14:00  05/30/18 14:00  05/30/18 14:00  05/30/18 14:00  05/30/18 14:00








Intake and Output: 


 











 05/30/18 05/31/18





 18:59 06:59


 


Intake Total 640 


 


Balance 640 














- Labs


Labs: 


 





 05/30/18 06:35 





 05/30/18 06:35 





 











PT  11.3 SECONDS (9.4-12.5)   05/29/18  06:30    


 


INR  0.98  (0.93-1.08)   05/29/18  06:30    


 


APTT  24.5 Seconds (25.1-36.5)  L  05/29/18  06:30

## 2018-05-30 NOTE — PCM.[email or web address]
Surgeon's Initial Post Op Note





- Surgeon's Notes


Surgeon: Dr. Terry


Assistant: Dr. Uribe PGY-3


Type of Anesthesia: General Endo


Anesthesia Administered By: Dr. Crawford


Pre-Operative Diagnosis: Symptomatic Cholelithiasis


Operative Findings: See operative report


Post-Operative Diagnosis: Same


Operation Performed: laparoscopic cholecystectomy


Specimen/Specimens Removed: Gallbladder


Estimated Blood Loss: EBL {In ML}: 10


Blood Products Given: N/A


Drains Used: No Drains


Post-Op Condition: Good


Date of Surgery/Procedure: 05/30/18


Time of Surgery/Procedure: 09:38

## 2018-05-30 NOTE — CON
DATE:  05/29/2018



NEPHROLOGY CONSULTATION



LOCATION:  [facility]



HISTORY OF PRESENT ILLNESS:  The patient is a 57-year-old female with past

medical history of hypertension, light chain deposition disease (per kidney

biopsy, status post chemo with Velcade, last received in 08/2013) and CKD

stage 3B/4, presented to ED yesterday with abdominal pain and nausea. 

Nephrology being consulted for advanced CKD management.



The patient was admitted to [facility] early this month with similar symptoms and

diagnosed with cholelithiasis.  The patient was discharged with plan to

have elective laparoscopic cholecystectomy done early next month.  She

reports that her symptoms started 2 days ago.  Denies any vomiting or

diarrhea.  P.o. intake has decreased substantially.  Currently, the patient

is pain free and tolerating diet.



The patient otherwise denies any dizziness, lightheadedness.  Not taking

any pain meds.  No urinary complaints.



PAST MEDICAL HISTORY:  As above.  Has been following in our renal clinic

with stable renal function.



SOCIAL HISTORY:  Denies smoking.  Occasional alcohol use.



FAMILY HISTORY:  Mother with heart disease.



REVIEW OF SYSTEMS:

CONSTITUTIONAL:  Has been doing well up until current symptoms.

HEENT:  Denies any dysphagia.  No URI symptoms.

RESPIRATORY:  No difficulty breathing.

CARDIOVASCULAR:  No chest pains or palpitations.

GI:  As per HPI.

:  No complaints.

MUSCULOSKELETAL:  Chronic back pain.

NEURO:  No dizziness.  No numbness in feet.

VITAL SIGNS:  This afternoon, blood pressure 121/78, heart rate 77,

respirations 20, temperature 98.3, O2 sat 98% on room air.



PHYSICAL EXAMINATION:

GENERAL:  No distress.  Conversing coherently in full sentences.

HEENT:  Moist mucous membranes.  Nonicteric.

RESPIRATORY:  Lungs clear to auscultation bilaterally.  No rales.  No

rhonchi.  No wheezes.

CARDIOVASCULAR:  Heart sounds S1, S2 normal.  No murmurs.  No gallops or

rubs.

GI:  Abdomen soft, no distention.

:  No bladder distention.

EXTREMITIES:  No lower leg edema.

SKIN:  Warm.  No cyanosis.

NEURO:  No numbness of feet.

PSYCHIATRIC:  Normal mood, normal affect.



LABORATORY DATA:  This morning, CBC:  WBC 4.1, hemoglobin 12.3, hematocrit

37.5, platelets 150.



Chemistry panel:  Sodium 146, potassium 4.4, chloride 112, bicarb 22, BUN

20, creatinine 2, glucose 132, calcium 9.2, AST 45, , T bili 0.7,

albumin 3.6.  UA, urine protein 30 mg/dL, glucose 250 mg/dL, moderate

bacteria, 5-10 wbc's per high-powered field.  Urine culture growing

gram-negative rods.



ASSESSMENT AND PLAN:

1.  Chronic kidney disease stage 3B/4.  Baseline serum creatinine around

1.9, corresponding to EGFR of 28 mL/minute.  Mild proteinuric kidney

disease in the setting of having light chain deposition disease, previously

treated with Velcade.  The patient currently on valsartan 320 mg for

anti-proteinuric effect.  We will hold medication for now.

2.  Cholecystitis.  The patient set for laparoscopic cholecystectomy

tomorrow.  The patient does have somewhat increased risk of acute kidney

injury from intraoperative cholangiogram.  Despite iodinated contrast not

being given systemically, there are case reports of acute kidney injury

associated with it and finding of contrast in systemic circulation status

post cholangiogram.

A.  We will keep the patient on isotonic saline at 100 mL/hour overnight

for contrast prophylaxis.

B.  Should avoid nonsteroidal antiinflammatory drugs for pain.

3.  Hypertension.  The patient currently normotensive, holding meds as

above.

4.  Light chain deposition disease, being monitored by Hematology every 6

weeks.  No M spike or abnormal immunofixation recently.  We will continue

to monitor for any increase in proteinuria periodically.

5.  Chronic kidney disease mineral bone disorder.  PTH mildly elevated

previously.  We will repeat along with 25-hydroxy vitamin D level.



Thank you for this referral.  We will be following closely.





__________________________________________

Manuel Rose MD





DD:  05/29/2018 16:05:26

DT:  05/29/2018 16:11:25

Job # [id number]

## 2018-05-30 NOTE — CP.PCM.PN
<Irina Interiano - Last Filed: 05/30/18 12:51>





Subjective





- Date & Time of Evaluation


Date of Evaluation: 05/30/18


Time of Evaluation: 11:05





- Subjective


Subjective: 





Seen and examined at the bedside earlier today, chart review.  Patient just 

returned from laparoscopic cholecystectomy.  Patient is drowsy but awake.  No 

acute distress, family at bedside.





Objective





- Vital Signs/Intake and Output


Vital Signs (last 24 hours): 


 











Temp Pulse Resp BP Pulse Ox


 


 97.7 F   73   20   140/72   99 


 


 05/30/18 10:35  05/30/18 10:35  05/30/18 10:35  05/30/18 10:35  05/30/18 10:35








Intake and Output: 


 











 05/30/18 05/30/18





 06:59 18:59


 


Intake Total 300 


 


Balance 300 














- Medications


Medications: 


 Current Medications





Acetaminophen (Tylenol 325mg Tab)  650 mg PO Q4H PRN


   PRN Reason: Pain, Mild (1-3)


Hydromorphone HCl (Dilaudid)  0.5 mg IVP Q15M PRN


   PRN Reason: Pain, moderate (4-7)


Losartan Potassium (Cozaar)  50 mg PO DAILY DOMINIC


   Last Admin: 05/30/18 11:49 Dose:  Not Given


Ondansetron HCl (Zofran Inj)  4 mg IVP Q6 PRN


   PRN Reason: Nausea/Vomiting


Tramadol HCl (Ultram)  25 mg PO Q6H PRN


   PRN Reason: Pain, moderate (4-7)











- Labs


Labs: 


 





 05/30/18 06:35 





 05/30/18 06:35 





 











PT  11.3 SECONDS (9.4-12.5)   05/29/18  06:30    


 


INR  0.98  (0.93-1.08)   05/29/18  06:30    


 


APTT  24.5 Seconds (25.1-36.5)  L  05/29/18  06:30    














- Constitutional


Appears: No Acute Distress





- Head Exam


Head Exam: NORMOCEPHALIC





- Eye Exam


Eye Exam: Normal appearance.  absent: Scleral icterus





- ENT Exam


ENT Exam: Mucous Membranes Moist





- Neck Exam


Neck Exam: Normal Inspection





- Respiratory Exam


Respiratory Exam: NORMAL BREATHING PATTERN.  absent: Respiratory Distress





- Cardiovascular Exam


Cardiovascular Exam: +S1, +S2





- GI/Abdominal Exam


GI & Abdominal Exam: Soft


Additional comments: 





lap sites dry and intact





- Extremities Exam


Extremities Exam: absent: Calf Tenderness, Pedal Edema





- Neurological Exam


Neurological Exam: Alert, Awake, Oriented x3





- Skin


Skin Exam: Dry, Warm





Assessment and Plan





- Assessment and Plan (Free Text)


Assessment: 





Assessment:





Status post laparoscopic cholecystectomy


Abdominal pain likely secondary to gallstone pancreatitis


History of light chain disease status post chemotherapy


Chronic kidney disease


Diabetes mellitus


Fatty liver


Urinary tract infection, urine CNS show gram-negative robert





Plan:





Trend LFTs which are showing a downward trend


NPO, IVF for hydration, diet as per surgery


Continue PPI


On IVF


surgical follow-up





Seen and discussed with Dr. Mack.








<Suzie Mack V - Last Filed: 05/30/18 23:32>





Objective





- Vital Signs/Intake and Output


Vital Signs (last 24 hours): 


 











Temp Pulse Resp BP Pulse Ox


 


 98.3 F   80   20   125/71   95 


 


 05/30/18 14:00  05/30/18 14:00  05/30/18 14:00  05/30/18 14:00  05/30/18 14:00








Intake and Output: 


 











 05/30/18 05/31/18





 18:59 06:59


 


Intake Total 640 


 


Balance 640 














- Labs


Labs: 


 





 05/30/18 06:35 





 05/30/18 06:35 





 











PT  11.3 SECONDS (9.4-12.5)   05/29/18  06:30    


 


INR  0.98  (0.93-1.08)   05/29/18  06:30    


 


APTT  24.5 Seconds (25.1-36.5)  L  05/29/18  06:30    














Attending/Attestation





- Attestation


I have personally seen and examined this patient.: Yes


I have fully participated in the care of the patient.: Yes


I have reviewed all pertinent clinical information, including history, physical 

exam and plan: Yes


Notes (Text): 


This is an addendum to GI progress report dictated by DOM Olsen.The 

patient was seen and examined earlier.  Medical records, lab studies, imagings 

were reviewed.  Last 24 hours events reviewed.  Agreed with the above treatment 

plan as outlined in  DOM Olsen's notes the with the addition of the 

following


05/30/18 23:32

## 2018-05-30 NOTE — CP.PCM.DIS
Provider





- Provider


Date of Admission: 


05/28/18 11:20





Attending physician: 


Enrique Terry MD





Time Spent in preparation of Discharge (in minutes): 35





Diagnosis





- Discharge Diagnosis


(1) Symptomatic cholelithiasis


Status: Acute   





(2) Cholelithiasis


Status: Chronic   





Hospital Course





- Lab Results


Lab Results: 


 Most Recent Lab Values











WBC  4.7 10^3/ul (4.5-11.0)   05/30/18  06:35    


 


RBC  4.62 10^6/uL (3.5-6.1)   05/30/18  06:35    


 


Hgb  13.9 g/dL (12.0-16.0)   05/30/18  06:35    


 


Hct  41.4 % (36.0-48.0)   05/30/18  06:35    


 


MCV  89.6 fl (80.0-105.0)   05/30/18  06:35    


 


MCH  30.1 pg (25.0-35.0)   05/30/18  06:35    


 


MCHC  33.6 g/dl (31.0-37.0)   05/30/18  06:35    


 


RDW  12.9 % (11.5-14.5)   05/30/18  06:35    


 


Plt Count  128 10^3/uL (120.0-450.0)   05/30/18  06:35    


 


MPV  12.1 fl (7.0-11.0)  H  05/30/18  06:35    


 


Gran %  42.4 % (50.0-68.0)  L  05/30/18  06:35    


 


Lymph % (Auto)  44.5 % (22.0-35.0)  H  05/30/18  06:35    


 


Mono % (Auto)  6.6 % (1.0-6.0)  H  05/30/18  06:35    


 


Eos % (Auto)  5.4 % (1.5-5.0)  H  05/30/18  06:35    


 


Baso % (Auto)  1.1 % (0.0-3.0)   05/30/18  06:35    


 


Gran #  1.98  (1.4-6.5)   05/30/18  06:35    


 


Lymph # (Auto)  2.1  (1.2-3.4)   05/30/18  06:35    


 


Mono # (Auto)  0.3  (0.1-0.6)   05/30/18  06:35    


 


Eos # (Auto)  0.3  (0.0-0.7)   05/30/18  06:35    


 


Baso # (Auto)  0.05 K/mm3 (0.0-2.0)   05/30/18  06:35    


 


PT  11.3 SECONDS (9.4-12.5)   05/29/18  06:30    


 


INR  0.98  (0.93-1.08)   05/29/18  06:30    


 


APTT  24.5 Seconds (25.1-36.5)  L  05/29/18  06:30    


 


Sodium  145 mmol/L (132-148)   05/30/18  06:35    


 


Potassium  4.4 mmol/L (3.6-5.0)   05/30/18  06:35    


 


Chloride  112 mmol/L ()  H  05/30/18  06:35    


 


Carbon Dioxide  22 mmol/L (21-33)   05/30/18  06:35    


 


Anion Gap  16  (10-20)   05/30/18  06:35    


 


BUN  19 mg/dL (7-21)   05/30/18  06:35    


 


Creatinine  1.9 mg/dl (0.7-1.2)  H  05/30/18  06:35    


 


Est GFR ( Amer)  33   05/30/18  06:35    


 


Est GFR (Non-Af Amer)  27   05/30/18  06:35    


 


POC Glucose (mg/dL)  100 mg/dL ()   05/30/18  15:55    


 


Random Glucose  133 mg/dL ()  H  05/30/18  06:35    


 


Hemoglobin A1c  7.1 % (4.2-6.5)  H  05/30/18  06:35    


 


Calcium  9.1 mg/dL (8.4-10.5)   05/30/18  06:35    


 


Magnesium  2.4 mg/dL (1.7-2.2)  H  05/28/18  08:50    


 


Total Bilirubin  0.4 mg/dL (0.2-1.3)   05/30/18  06:35    


 


AST  30 U/L (14-36)   05/30/18  06:35    


 


ALT  94 U/L (7-56)  H  05/30/18  06:35    


 


Alkaline Phosphatase  132 U/L ()  H  05/30/18  06:35    


 


Troponin I  < 0.01 ng/mL  05/28/18  08:50    


 


Total Protein  7.2 g/dL (5.8-8.3)   05/30/18  06:35    


 


Albumin  3.9 g/dL (3.0-4.8)   05/30/18  06:35    


 


Globulin  3.3 gm/dL  05/30/18  06:35    


 


Albumin/Globulin Ratio  1.2  (1.1-1.8)   05/30/18  06:35    


 


Amylase  96 U/L ()   05/29/18  06:30    


 


Lipase  269 U/L ()   05/29/18  06:30    


 


25-OH Vitamin D Total  26.9 NG/ML (30.0-100.0)  L  05/30/18  06:35    


 


Urine Color  Yellow  (YELLOW)   05/28/18  09:01    


 


Urine Appearance  Sl cloudy  (CLEAR)   05/28/18  09:01    


 


Urine pH  6.0  (4.7-8.0)   05/28/18  09:01    


 


Ur Specific Gravity  1.020  (1.005-1.035)   05/28/18  09:01    


 


Urine Protein  30 mg/dL (<30 mg/dL)  H  05/28/18  09:01    


 


Urine Glucose (UA)  250 mg/dL (NEGATIVE)  H  05/28/18  09:01    


 


Urine Ketones  Negative mg/dL (NEGATIVE)   05/28/18  09:01    


 


Urine Blood  Trace-lysed  (NEGATIVE)  H  05/28/18  09:01    


 


Urine Nitrate  Negative  (NEGATIVE)   05/28/18  09:01    


 


Urine Bilirubin  Negative  (NEGATIVE)   05/28/18  09:01    


 


Urine Urobilinogen  0.2 E.U./dL (<1 E.U./dL)   05/28/18  09:01    


 


Ur Leukocyte Esterase  Trace Everardo/uL (NEGATIVE)  H  05/28/18  09:01    


 


Urine RBC  0 - 2 /hpf (0-2)   05/28/18  09:01    


 


Urine WBC  5 - 10 /hpf (0-6)   05/28/18  09:01    


 


Ur Epithelial Cells  6 - 8 /hpf (0-5)   05/28/18  09:01    


 


Urine Bacteria  Mod  (NEG)   05/28/18  09:01    














- Hospital Course


Hospital Course: 





58 yo F with history of cholelithiasis, who presented for an episode of 

symptomatic cholelithiasis, without cholecystitis. Patient has had multiple 

bouts of symptomatic cholelithiasis as well as an episode of cholecystitis and 

gallstone pancreatitis, though at the time she elected for outpatient surgery. 

She was taken for laparoscopic cholecystectomy the morning of 5/30/18, which 

she tolerated well with no complications. Patient is now ambulating around the 

halls without difficulty or pain, passing gas, tolerating a regular diet, and 

urinating normally. Her pain is well controlled. She denies fever, chills, 

nausea, vomiting, chest pain, cough, shortness of breath. Restrictions, diet 

instructions, and follow up instructions were discussed, and patient was 

discharged to home.





Discharge Exam





- Head Exam


Head Exam: ATRAUMATIC, NORMOCEPHALIC





- Eye Exam


Eye Exam: Normal appearance





- ENT Exam


ENT Exam: Mucous Membranes Moist





- Respiratory Exam


Respiratory Exam: NORMAL BREATHING PATTERN





- Cardiovascular Exam


Cardiovascular Exam: +S1, +S2





- GI/Abdominal Exam


GI & Abdominal Exam: Normal Bowel Sounds, Soft, Tenderness (minimal, appropriate

, near incisions).  absent: Distended, Firm, Guarding, Rebound, Rigid


Additional comments: 





Incisions with dermabond dressing; CDI; no erythema, fluctuance, or drainage





- Neurological Exam


Neurological exam: Alert, Oriented x3





- Psychiatric Exam


Psychiatric exam: Normal Affect, Normal Mood





- Skin


Skin Exam: Dry, Intact, Normal Color





Discharge Plan





- Follow Up Plan


Condition: IMPROVED


Disposition: HOME/ ROUTINE


Instructions:  Cholecystectomy (DC)


Additional Instructions: 


-- Follow up with Dr. Terry in 1-2 weeks


-- Adhere to a low fat diet; avoid high fat dairy, cheese, butter, oil, greasy 

snacks, etc


-- You may shower, but do not take a bath until the adhesive falls off


-- Do not lift anything heavier than 10 lbs for 4 weeks


-- You may use over-the-counter medications for pain, such as Motrin, Alleve, 

or Tylenol. Contact Dr. Terry's office if pain is inadequately controlled


-- Call Dr. Terry's office or return to the ER for any fever greater than 

100.4, severe nausea and vomiting, or any other concerning issues


Referrals: 


Enrique eTrry MD [Staff Provider] -

## 2018-06-09 NOTE — OP
PROCEDURE DATE:  05/30/2018



PREOPERATIVE DIAGNOSIS:  Acute on chronic cholecystitis, cholelithiasis.



POSTOPERATIVE DIAGNOSIS:  Acute on chronic cholecystitis, cholelithiasis.



PROCEDURE:  Laparoscopic cholecystectomy.



SURGEON:  Enrique Terry MD.



ASSISTANT:  Dr. Yee.



ESTIMATED BLOOD LOSS:  Minimal.



COMPLICATIONS:  None.



FINDINGS:  Acute on chronic cholecystitis and lithiasis.



DESCRIPTION OF PROCEDURE:  In the operating room, patient was identified by

name, name of procedure, laterality, my ella, the consent form.  This was

done after the area was prepped with chlorhexidine, waiting 3 minutes and

then draped.  In the operating room, patient having been identified and the

timeout being successful, a supraumbilical incision was made through the

skin and subcutaneous tissues, and the abdomen entered with Veress needle

with an opening pressure of about 5 and a closing pressure about 10 after

2.5 liters of CO2 insufflated.  A Visi-Port was placed in the abdomen

without issue.  The xiphoid 5 and two lateral 5s were placed, drawing up in

the fundus and then the infundibulum.  The peritoneum over the cystic duct

and artery were readily identified, cleaned, circumscribed.  The cystic

duct was clipped as was the artery after the view safety was confirmed. 

Each structure was doubly clipped and divided and the gallbladder taken off

the liver bed using the spatula.  It was placed in the bag, having been

removed from the liver bed with the cautery at 15.  The bag, during this

procedure, broke, and there was some spillage.  Thereafter, meticulous

removal of these stones using the 10 mm scoop and the 5 mm scope.  The

abdomen was copiously irrigated and dried with good effect.  There was

nothing else untoward.  There was very little bleeding, no leaking of bile.

The CO2 was removed and the abdomen examined.  There was nothing untoward. 

The incisions were closed with the usage of Vicryl followed by subcuticular

PDS and Dermabond.  Patient was taken to recovery room in good condition

after the sponge and needle count was declared correct.





__________________________________________

Enrique Terry MD



DD:  06/08/2018 15:54:40

DT:  06/08/2018 23:57:38

Job # [id number]

## 2023-02-14 NOTE — US
HISTORY:

57yoF hx of cholithiasis, having RUQ pain. .  Technologist notation 

indicates the patient ate breakfast at 8:30 a.m. prior to this exam 



COMPARISON:

Comparison made with prior study MRI/ MRCP of the abdomen and CT scan 

abdomen pelvis dated 05/07/2018 and 05/05/2018 respectively.  

Comparison also made with abdominal ultrasound 05/05/2018. 



TECHNIQUE:

Sonographic evaluation of the right upper quadrant of the abdomen.



FINDINGS:



LIVER:

The liver rim was measured at 15 cm on this exam however the liver 

measured over 19 cm on prior CT scan 05/05/2018 and this is likely 

the more accurate measurement.  Moderate diffuse fatty hepatic 

infiltration. 



No obvious hepatic masses or collections seen on images presented. 



No gross intrahepatic bile duct dilatation. 



GALLBLADDER:

Gallbladder is contracted (the due to nonfasting state as above).  

There are multiple shadowing intraluminal gallbladder calculi. 



No pericholecystic fluid collections or sonographic Horn sign as 

per technologist notation. 



COMMON BILE DUCT:

Measures 5.2 mm.  No stones. No dilatation.



PANCREAS:

Unremarkable as visualized. No mass. No ductal dilatation.



RIGHT KIDNEY:

Measures 9.1 x 3.4 x 5.0 cm in length. Normal echogenicity. No 

calculus, mass, or hydronephrosis.



AORTA:

Not visualized



IVC:

Unremarkable.



OTHER FINDINGS:

None .



IMPRESSION:

Cholelithiasis. . Liver was measured at 15 cm on the current exam 

although was enlarged measuring over 19 cm on prior CT scan of the 

abdomen and pelvis , the latter of which representing a more accurate 

measurement. Mild fatty hepatic infiltration Abbe Flap (Upper To Lower Lip) Text: The defect of the lower lip was assessed and measured.  Given the location and size of the defect, an Abbe flap was deemed most appropriate. Using a sterile surgical marker, an appropriate Abbe flap was measured and drawn on the upper lip. Local anesthesia was then infiltrated.  A scalpel was then used to incise the upper lip through and through the skin, vermilion, muscle and mucosa, leaving the flap pedicled on the opposite side.  The flap was then rotated and transferred to the lower lip defect.  The flap was then sutured into place with a three layer technique, closing the orbicularis oris muscle layer with subcutaneous buried sutures, followed by a mucosal layer and an epidermal layer.

## 2025-03-09 NOTE — CARD
--------------- APPROVED REPORT --------------





EKG Measurement

Heart [id number]

DE [id number]

[id number]

[id number]

[id number]



<Conclusion>

Sinus tachycardia

Possible Left atrial enlargement

Borderline ECG This was a shared visit with the DAISHA. I reviewed and verified the documentation.